# Patient Record
Sex: FEMALE | Race: BLACK OR AFRICAN AMERICAN | Employment: FULL TIME | URBAN - METROPOLITAN AREA
[De-identification: names, ages, dates, MRNs, and addresses within clinical notes are randomized per-mention and may not be internally consistent; named-entity substitution may affect disease eponyms.]

---

## 2017-03-16 ENCOUNTER — OFFICE VISIT (OUTPATIENT)
Dept: FAMILY MEDICINE CLINIC | Age: 47
End: 2017-03-16

## 2017-03-16 VITALS
OXYGEN SATURATION: 98 % | RESPIRATION RATE: 18 BRPM | DIASTOLIC BLOOD PRESSURE: 106 MMHG | SYSTOLIC BLOOD PRESSURE: 155 MMHG | HEART RATE: 94 BPM | WEIGHT: 197 LBS | TEMPERATURE: 98.4 F | HEIGHT: 68 IN | BODY MASS INDEX: 29.86 KG/M2

## 2017-03-16 DIAGNOSIS — I10 ESSENTIAL HYPERTENSION: Primary | ICD-10-CM

## 2017-03-16 RX ORDER — VALSARTAN AND HYDROCHLOROTHIAZIDE 80; 12.5 MG/1; MG/1
1 TABLET, FILM COATED ORAL DAILY
Qty: 30 TAB | Refills: 1 | Status: SHIPPED | OUTPATIENT
Start: 2017-03-16 | End: 2017-04-03 | Stop reason: DRUGHIGH

## 2017-03-16 NOTE — PATIENT INSTRUCTIONS
High Blood Pressure: Care Instructions  Your Care Instructions  If your blood pressure is usually above 140/90, you have high blood pressure, or hypertension. That means the top number is 140 or higher or the bottom number is 90 or higher, or both. Despite what a lot of people think, high blood pressure usually doesn't cause headaches or make you feel dizzy or lightheaded. It usually has no symptoms. But it does increase your risk for heart attack, stroke, and kidney or eye damage. The higher your blood pressure, the more your risk increases. Your doctor will give you a goal for your blood pressure. Your goal will be based on your health and your age. An example of a goal is to keep your blood pressure below 140/90. Lifestyle changes, such as eating healthy and being active, are always important to help lower blood pressure. You might also take medicine to reach your blood pressure goal.  Follow-up care is a key part of your treatment and safety. Be sure to make and go to all appointments, and call your doctor if you are having problems. It's also a good idea to know your test results and keep a list of the medicines you take. How can you care for yourself at home? Medical treatment  · If you stop taking your medicine, your blood pressure will go back up. You may take one or more types of medicine to lower your blood pressure. Be safe with medicines. Take your medicine exactly as prescribed. Call your doctor if you think you are having a problem with your medicine. · Talk to your doctor before you start taking aspirin every day. Aspirin can help certain people lower their risk of a heart attack or stroke. But taking aspirin isn't right for everyone, because it can cause serious bleeding. · See your doctor regularly. You may need to see the doctor more often at first or until your blood pressure comes down.   · If you are taking blood pressure medicine, talk to your doctor before you take decongestants or anti-inflammatory medicine, such as ibuprofen. Some of these medicines can raise blood pressure. · Learn how to check your blood pressure at home. Lifestyle changes  · Stay at a healthy weight. This is especially important if you put on weight around the waist. Losing even 10 pounds can help you lower your blood pressure. · If your doctor recommends it, get more exercise. Walking is a good choice. Bit by bit, increase the amount you walk every day. Try for at least 30 minutes on most days of the week. You also may want to swim, bike, or do other activities. · Avoid or limit alcohol. Talk to your doctor about whether you can drink any alcohol. · Try to limit how much sodium you eat to less than 2,300 milligrams (mg) a day. Your doctor may ask you to try to eat less than 1,500 mg a day. · Eat plenty of fruits (such as bananas and oranges), vegetables, legumes, whole grains, and low-fat dairy products. · Lower the amount of saturated fat in your diet. Saturated fat is found in animal products such as milk, cheese, and meat. Limiting these foods may help you lose weight and also lower your risk for heart disease. · Do not smoke. Smoking increases your risk for heart attack and stroke. If you need help quitting, talk to your doctor about stop-smoking programs and medicines. These can increase your chances of quitting for good. When should you call for help? Call 911 anytime you think you may need emergency care. This may mean having symptoms that suggest that your blood pressure is causing a serious heart or blood vessel problem. Your blood pressure may be over 180/110. For example, call 911 if:  · You have symptoms of a heart attack. These may include:  ¨ Chest pain or pressure, or a strange feeling in the chest.  ¨ Sweating. ¨ Shortness of breath. ¨ Nausea or vomiting. ¨ Pain, pressure, or a strange feeling in the back, neck, jaw, or upper belly or in one or both shoulders or arms.   ¨ Lightheadedness or sudden weakness. ¨ A fast or irregular heartbeat. · You have symptoms of a stroke. These may include:  ¨ Sudden numbness, tingling, weakness, or loss of movement in your face, arm, or leg, especially on only one side of your body. ¨ Sudden vision changes. ¨ Sudden trouble speaking. ¨ Sudden confusion or trouble understanding simple statements. ¨ Sudden problems with walking or balance. ¨ A sudden, severe headache that is different from past headaches. · You have severe back or belly pain. Do not wait until your blood pressure comes down on its own. Get help right away. Call your doctor now or seek immediate care if:  · Your blood pressure is much higher than normal (such as 180/110 or higher), but you don't have symptoms. · You think high blood pressure is causing symptoms, such as:  ¨ Severe headache. ¨ Blurry vision. Watch closely for changes in your health, and be sure to contact your doctor if:  · Your blood pressure measures 140/90 or higher at least 2 times. That means the top number is 140 or higher or the bottom number is 90 or higher, or both. · You think you may be having side effects from your blood pressure medicine. · Your blood pressure is usually normal, but it goes above normal at least 2 times. Where can you learn more? Go to http://alexa-kirby.info/. Enter K449 in the search box to learn more about \"High Blood Pressure: Care Instructions. \"  Current as of: August 8, 2016  Content Version: 11.1  © 6384-5878 [employer]. Care instructions adapted under license by [employer] (which disclaims liability or warranty for this information). If you have questions about a medical condition or this instruction, always ask your healthcare professional. [place] [address] any warranty or liability for your use of this information.        DASH Diet: Care Instructions  Your Care Instructions  The DASH diet is an eating plan that can help lower your blood pressure. DASH stands for Dietary Approaches to Stop Hypertension. Hypertension is high blood pressure. The DASH diet focuses on eating foods that are high in calcium, potassium, and magnesium. These nutrients can lower blood pressure. The foods that are highest in these nutrients are fruits, vegetables, low-fat dairy products, nuts, seeds, and legumes. But taking calcium, potassium, and magnesium supplements instead of eating foods that are high in those nutrients does not have the same effect. The DASH diet also includes whole grains, fish, and poultry. The DASH diet is one of several lifestyle changes your doctor may recommend to lower your high blood pressure. Your doctor may also want you to decrease the amount of sodium in your diet. Lowering sodium while following the DASH diet can lower blood pressure even further than just the DASH diet alone. Follow-up care is a key part of your treatment and safety. Be sure to make and go to all appointments, and call your doctor if you are having problems. It's also a good idea to know your test results and keep a list of the medicines you take. How can you care for yourself at home? Following the DASH diet  · Eat 4 to 5 servings of fruit each day. A serving is 1 medium-sized piece of fruit, ½ cup chopped or canned fruit, 1/4 cup dried fruit, or 4 ounces (½ cup) of fruit juice. Choose fruit more often than fruit juice. · Eat 4 to 5 servings of vegetables each day. A serving is 1 cup of lettuce or raw leafy vegetables, ½ cup of chopped or cooked vegetables, or 4 ounces (½ cup) of vegetable juice. Choose vegetables more often than vegetable juice. · Get 2 to 3 servings of low-fat and fat-free dairy each day. A serving is 8 ounces of milk, 1 cup of yogurt, or 1 ½ ounces of cheese. · Eat 6 to 8 servings of grains each day.  A serving is 1 slice of bread, 1 ounce of dry cereal, or ½ cup of cooked rice, pasta, or cooked cereal. Try to choose whole-grain products as much as possible. · Limit lean meat, poultry, and fish to 2 servings each day. A serving is 3 ounces, about the size of a deck of cards. · Eat 4 to 5 servings of nuts, seeds, and legumes (cooked dried beans, lentils, and split peas) each week. A serving is 1/3 cup of nuts, 2 tablespoons of seeds, or ½ cup of cooked beans or peas. · Limit fats and oils to 2 to 3 servings each day. A serving is 1 teaspoon of vegetable oil or 2 tablespoons of salad dressing. · Limit sweets and added sugars to 5 servings or less a week. A serving is 1 tablespoon jelly or jam, ½ cup sorbet, or 1 cup of lemonade. · Eat less than 2,300 milligrams (mg) of sodium a day. If you limit your sodium to 1,500 mg a day, you can lower your blood pressure even more. Tips for success  · Start small. Do not try to make dramatic changes to your diet all at once. You might feel that you are missing out on your favorite foods and then be more likely to not follow the plan. Make small changes, and stick with them. Once those changes become habit, add a few more changes. · Try some of the following:  ¨ Make it a goal to eat a fruit or vegetable at every meal and at snacks. This will make it easy to get the recommended amount of fruits and vegetables each day. ¨ Try yogurt topped with fruit and nuts for a snack or healthy dessert. ¨ Add lettuce, tomato, cucumber, and onion to sandwiches. ¨ Combine a ready-made pizza crust with low-fat mozzarella cheese and lots of vegetable toppings. Try using tomatoes, squash, spinach, broccoli, carrots, cauliflower, and onions. ¨ Have a variety of cut-up vegetables with a low-fat dip as an appetizer instead of chips and dip. ¨ Sprinkle sunflower seeds or chopped almonds over salads. Or try adding chopped walnuts or almonds to cooked vegetables. ¨ Try some vegetarian meals using beans and peas. Add garbanzo or kidney beans to salads.  Make burritos and tacos with mashed cooper beans or black beans.  Where can you learn more? Go to http://alexa-kirby.info/. Enter P152 in the search box to learn more about \"DASH Diet: Care Instructions. \"  Current as of: March 23, 2016  Content Version: 11.1  © 1129-0829 [employer]. Care instructions adapted under license by [employer] (which disclaims liability or warranty for this information). If you have questions about a medical condition or this instruction, always ask your healthcare professional. [address] any warranty or liability for your use of this information.

## 2017-03-16 NOTE — MR AVS SNAPSHOT
Visit Information Date & Time Provider Department Dept. Phone Encounter #  
 3/16/2017  1:15 PM Franklyn Hernandez NP [address] [facility] [phone] [id number] Follow-up Instructions Return in about 4 days (around 3/20/2017) for recheck BP and fasting labs. Upcoming Health Maintenance Date Due Pneumococcal 19-64 Medium Risk (1 of 1 - PPSV23) 9/10/1989 DTaP/Tdap/Td series (1 - Tdap) 9/10/1991 PAP AKA CERVICAL CYTOLOGY 9/10/1991 INFLUENZA AGE 9 TO ADULT 8/1/2016 Allergies as of 3/16/2017  Review Complete On: 3/16/2017 By: Franklyn Hernandez NP No Known Allergies Current Immunizations  Never Reviewed No immunizations on file. Not reviewed this visit You Were Diagnosed With   
  
 Codes Comments Essential hypertension    -  Primary ICD-10-CM: I10 
ICD-9-CM: 401.9 Vitals BP Pulse Temp Resp Height(growth percentile) Weight(growth percentile) (!) 155/106 (BP 1 Location: Right arm, BP Patient Position: Sitting) 94 98.4 °F (36.9 °C) (Oral) 18 5' 8\" (1.727 m) 197 lb (89.4 kg) LMP SpO2 BMI OB Status Smoking Status 02/20/2017 98% 29.95 kg/m2 Having regular periods Current Every Day Smoker Vitals History BMI and BSA Data Body Mass Index Body Surface Area  
 29.95 kg/m 2 2.07 m 2 Preferred Pharmacy Pharmacy Name Phone Darrell Yang [address], [address] AT [facility] OF  [facility] [phone] Your Updated Medication List  
  
   
This list is accurate as of: 3/16/17  1:37 PM.  Always use your most recent med list.  
  
  
  
  
 amitriptyline 100 mg tablet Commonly known as:  ELAVIL Take 200 mg by mouth nightly. clonazePAM 1 mg tablet Commonly known as:  Lyndol [place] Take 1 mg by mouth two (2) times a day. QUEtiapine 50 mg tablet Commonly known as:  SEROquel Take 50 mg by mouth nightly. valsartan-hydroCHLOROthiazide 80-12.5 mg per tablet Commonly known as:  DIOVAN-HCT Take 1 Tab by mouth daily. XANAX 1 mg tablet Generic drug:  ALPRAZolam  
Take 1 mg by mouth three (3) times daily as needed for Anxiety. Prescriptions Sent to Pharmacy Refills  
 valsartan-hydroCHLOROthiazide (DIOVAN-HCT) 80-12.5 mg per tablet 1 Sig: Take 1 Tab by mouth daily. Class: Normal  
 Pharmacy: [facility] [place] [address] [facility] #: [phone] Route: Oral  
  
Follow-up Instructions Return in about 4 days (around 3/20/2017) for recheck BP and fasting labs. Patient Instructions High Blood Pressure: Care Instructions Your Care Instructions If your blood pressure is usually above 140/90, you have high blood pressure, or hypertension. That means the top number is 140 or higher or the bottom number is 90 or higher, or both. Despite what a lot of people think, high blood pressure usually doesn't cause headaches or make you feel dizzy or lightheaded. It usually has no symptoms. But it does increase your risk for heart attack, stroke, and kidney or eye damage. The higher your blood pressure, the more your risk increases. Your doctor will give you a goal for your blood pressure. Your goal will be based on your health and your age. An example of a goal is to keep your blood pressure below 140/90. Lifestyle changes, such as eating healthy and being active, are always important to help lower blood pressure. You might also take medicine to reach your blood pressure goal. 
Follow-up care is a key part of your treatment and safety. Be sure to make and go to all appointments, and call your doctor if you are having problems. It's also a good idea to know your test results and keep a list of the medicines you take. How can you care for yourself at home? Medical treatment · If you stop taking your medicine, your blood pressure will go back up. You may take one or more types of medicine to lower your blood pressure. Be safe with medicines. Take your medicine exactly as prescribed. Call your doctor if you think you are having a problem with your medicine. · Talk to your doctor before you start taking aspirin every day. Aspirin can help certain people lower their risk of a heart attack or stroke. But taking aspirin isn't right for everyone, because it can cause serious bleeding. · See your doctor regularly. You may need to see the doctor more often at first or until your blood pressure comes down. · If you are taking blood pressure medicine, talk to your doctor before you take decongestants or anti-inflammatory medicine, such as ibuprofen. Some of these medicines can raise blood pressure. · Learn how to check your blood pressure at home. Lifestyle changes · Stay at a healthy weight. This is especially important if you put on weight around the waist. Losing even 10 pounds can help you lower your blood pressure. · If your doctor recommends it, get more exercise. Walking is a good choice. Bit by bit, increase the amount you walk every day. Try for at least 30 minutes on most days of the week. You also may want to swim, bike, or do other activities. · Avoid or limit alcohol. Talk to your doctor about whether you can drink any alcohol. · Try to limit how much sodium you eat to less than 2,300 milligrams (mg) a day. Your doctor may ask you to try to eat less than 1,500 mg a day. · Eat plenty of fruits (such as bananas and oranges), vegetables, legumes, whole grains, and low-fat dairy products. · Lower the amount of saturated fat in your diet. Saturated fat is found in animal products such as milk, cheese, and meat. Limiting these foods may help you lose weight and also lower your risk for heart disease. · Do not smoke. Smoking increases your risk for heart attack and stroke. If you need help quitting, talk to your doctor about stop-smoking programs and medicines. These can increase your chances of quitting for good. When should you call for help? Call 911 anytime you think you may need emergency care. This may mean having symptoms that suggest that your blood pressure is causing a serious heart or blood vessel problem. Your blood pressure may be over 180/110. For example, call 911 if: 
· You have symptoms of a heart attack. These may include: ¨ Chest pain or pressure, or a strange feeling in the chest. 
¨ Sweating. ¨ Shortness of breath. ¨ Nausea or vomiting. ¨ Pain, pressure, or a strange feeling in the back, neck, jaw, or upper belly or in one or both shoulders or arms. ¨ Lightheadedness or sudden weakness. ¨ A fast or irregular heartbeat. · You have symptoms of a stroke. These may include: 
¨ Sudden numbness, tingling, weakness, or loss of movement in your face, arm, or leg, especially on only one side of your body. ¨ Sudden vision changes. ¨ Sudden trouble speaking. ¨ Sudden confusion or trouble understanding simple statements. ¨ Sudden problems with walking or balance. ¨ A sudden, severe headache that is different from past headaches. · You have severe back or belly pain. Do not wait until your blood pressure comes down on its own. Get help right away. Call your doctor now or seek immediate care if: 
· Your blood pressure is much higher than normal (such as 180/110 or higher), but you don't have symptoms. · You think high blood pressure is causing symptoms, such as: ¨ Severe headache. ¨ Blurry vision. Watch closely for changes in your health, and be sure to contact your doctor if: 
· Your blood pressure measures 140/90 or higher at least 2 times. That means the top number is 140 or higher or the bottom number is 90 or higher, or both. · You think you may be having side effects from your blood pressure medicine. · Your blood pressure is usually normal, but it goes above normal at least 2 times. Where can you learn more? Go to http://alexa-kirby.info/. Enter O408 in the search box to learn more about \"High Blood Pressure: Care Instructions. \" Current as of: August 8, 2016 Content Version: 11.1 © 2013-1862 [employer]. Care instructions adapted under license by for; to (do) Centers (which disclaims liability or warranty for this information). If you have questions about a medical condition or this instruction, always ask your healthcare professional. [address] any warranty or liability for your use of this information. DASH Diet: Care Instructions Your Care Instructions The DASH diet is an eating plan that can help lower your blood pressure. DASH stands for Dietary Approaches to Stop Hypertension. Hypertension is high blood pressure. The DASH diet focuses on eating foods that are high in calcium, potassium, and magnesium. These nutrients can lower blood pressure. The foods that are highest in these nutrients are fruits, vegetables, low-fat dairy products, nuts, seeds, and legumes. But taking calcium, potassium, and magnesium supplements instead of eating foods that are high in those nutrients does not have the same effect. The DASH diet also includes whole grains, fish, and poultry. The DASH diet is one of several lifestyle changes your doctor may recommend to lower your high blood pressure. Your doctor may also want you to decrease the amount of sodium in your diet. Lowering sodium while following the DASH diet can lower blood pressure even further than just the DASH diet alone. Follow-up care is a key part of your treatment and safety. Be sure to make and go to all appointments, and call your doctor if you are having problems. It's also a good idea to know your test results and keep a list of the medicines you take. How can you care for yourself at home? Following the DASH diet · Eat 4 to 5 servings of fruit each day. A serving is 1 medium-sized piece of fruit, ½ cup chopped or canned fruit, 1/4 cup dried fruit, or 4 ounces (½ cup) of fruit juice. Choose fruit more often than fruit juice. · Eat 4 to 5 servings of vegetables each day. A serving is 1 cup of lettuce or raw leafy vegetables, ½ cup of chopped or cooked vegetables, or 4 ounces (½ cup) of vegetable juice. Choose vegetables more often than vegetable juice. · Get 2 to 3 servings of low-fat and fat-free dairy each day. A serving is 8 ounces of milk, 1 cup of yogurt, or 1 ½ ounces of cheese. · Eat 6 to 8 servings of grains each day. A serving is 1 slice of bread, 1 ounce of dry cereal, or ½ cup of cooked rice, pasta, or cooked cereal. Try to choose whole-grain products as much as possible. · Limit lean meat, poultry, and fish to 2 servings each day. A serving is 3 ounces, about the size of a deck of cards. · Eat 4 to 5 servings of nuts, seeds, and legumes (cooked dried beans, lentils, and split peas) each week. A serving is 1/3 cup of nuts, 2 tablespoons of seeds, or ½ cup of cooked beans or peas. · Limit fats and oils to 2 to 3 servings each day. A serving is 1 teaspoon of vegetable oil or 2 tablespoons of salad dressing. · Limit sweets and added sugars to 5 servings or less a week. A serving is 1 tablespoon jelly or jam, ½ cup sorbet, or 1 cup of lemonade. · Eat less than 2,300 milligrams (mg) of sodium a day. If you limit your sodium to 1,500 mg a day, you can lower your blood pressure even more. Tips for success · Start small. Do not try to make dramatic changes to your diet all at once. You might feel that you are missing out on your favorite foods and then be more likely to not follow the plan. Make small changes, and stick with them. Once those changes become habit, add a few more changes. · Try some of the following: ¨ Make it a goal to eat a fruit or vegetable at every meal and at snacks. This will make it easy to get the recommended amount of fruits and vegetables each day. ¨ Try yogurt topped with fruit and nuts for a snack or healthy dessert. ¨ Add lettuce, tomato, cucumber, and onion to sandwiches. ¨ Combine a ready-made pizza crust with low-fat mozzarella cheese and lots of vegetable toppings. Try using tomatoes, squash, spinach, broccoli, carrots, cauliflower, and onions. ¨ Have a variety of cut-up vegetables with a low-fat dip as an appetizer instead of chips and dip. ¨ Sprinkle sunflower seeds or chopped almonds over salads. Or try adding chopped walnuts or almonds to cooked vegetables. ¨ Try some vegetarian meals using beans and peas. Add garbanzo or kidney beans to salads. Make burritos and tacos with mashed cooper beans or black beans. Where can you learn more? Go to http://alexaBlackwavekirby.info/. Enter X394 in the search box to learn more about \"DASH Diet: Care Instructions. \" Current as of: March 23, 2016 Content Version: 11.1 © 0360-4076 [employer]. Care instructions adapted under license by [employer] (which disclaims liability or warranty for this information). If you have questions about a medical condition or this instruction, always ask your healthcare professional. [place] [address] any warranty or liability for your use of this information. Introducing \[facility] [place] [facility]\"" & HEALTH SERVICES! [employer] introduces [employer] patient portal. Now you can access parts of your medical record, email your doctor's office, and request medication refills online. 1. In your internet browser, go to [email or web address] 2. Click on the First Time User? Click Here link in the Sign In box. You will see the New Member Sign Up page. 3. Enter your Parabel Access Code exactly as it appears below.  You will not need to use this code after youve completed the sign-up process. If you do not sign up before the expiration date, you must request a new code. · Simple IT Access Code: [email or web address] Expires: 6/14/2017  1:37 PM 
 
4. Enter the last four digits of your Social Security Number (xxxx) and Date of Birth (mm/dd/yyyy) as indicated and click Submit. You will be taken to the next sign-up page. 5. Create a Simple IT ID. This will be your Simple IT login ID and cannot be changed, so think of one that is secure and easy to remember. 6. Create a Simple IT password. You can change your password at any time. 7. Enter your Password Reset Question and Answer. This can be used at a later time if you forget your password. 8. Enter your e-mail address. You will receive e-mail notification when new information is available in [address]. 9. Click Sign Up. You can now view and download portions of your medical record. 10. Click the Download Summary menu link to download a portable copy of your medical information. If you have questions, please visit the Frequently Asked Questions section of the Simple IT website. Remember, Simple IT is NOT to be used for urgent needs. For medical emergencies, dial 911. Now available from your iPhone and Android! Please provide this summary of care documentation to your next provider. Your primary care clinician is listed as Adeline Ventura. If you have any questions after today's visit, please call [phone].

## 2017-03-16 NOTE — PROGRESS NOTES
Chief Complaint   Patient presents with    Elevated Blood Pressure     Patient in office today for elevated bp; bp was 161/113 at clinic for work; states she has had headaches. 1. Have you been to the ER, urgent care clinic since your last visit? Hospitalized since your last visit? No    2. Have you seen or consulted any other health care providers outside of the [address] [place] [address] since your last visit? Include any pap smears or colon screening. No    Pt thought she was coming down with a stomach virus. Felt weak. Went to work nurse who checked her BP and it was elevated. Has been having headaches on and off. Pt has a family history of high BP. Denies any sx of fluid retention. Has been constipated on and off. Attributing to her elavil. Has internal hemorrhoids from straining which has caused blood in her stool. Denies any cp, sob, and dyspnea. Denies any dizziness. HAs occur as the day goes by. One woke her up at 2 am.     Denies any excessive salt. Pt is a smoker. Less than 1 PPD. Trying to follow a healthy diet. Has gained some weight. Pt works for [employer]. Unable to work until her BP has improved. Denies any other concerns at this. Denies any other concerns at this time. Chief Complaint   Patient presents with    Elevated Blood Pressure     she is a 55y.o. year old female who presents for evalution. Reviewed PmHx, RxHx, FmHx, SocHx, AllgHx and updated and dated in the chart.     Review of Systems - negative except as listed above in the HPI    Objective:     Vitals:    03/16/17 1319   BP: (!) 155/106   Pulse: 94   Resp: 18   Temp: 98.4 °F (36.9 °C)   TempSrc: Oral   SpO2: 98%   Weight: 197 lb (89.4 kg)   Height: 5' 8\" (1.727 m)     Physical Examination: General appearance - alert, well appearing, and in no distress  Eyes - pupils equal and reactive, extraocular eye movements intact  Ears - bilateral TM's and external ear canals normal  Nose - normal and patent, no erythema, discharge or polyps and normal nontender sinuses  Mouth - mucous membranes moist, pharynx normal without lesions  Neck - supple, no significant adenopathy, carotids upstroke normal bilaterally, no bruits, thyroid exam: thyroid is normal in size without nodules or tenderness  Chest - clear to auscultation, no wheezes, rales or rhonchi, symmetric air entry  Heart - normal rate, regular rhythm, normal S1, S2, no murmurs  Extremities - peripheral pulses normal, no ankle edema, no clubbing or cyanosis    Assessment/ Plan:   Theodora Rome was seen today for elevated blood pressure. Diagnoses and all orders for this visit:    Essential hypertension  -     valsartan-hydroCHLOROthiazide (DIOVAN-HCT) 80-12.5 mg per tablet; Take 1 Tab by mouth daily. Start new rx. Reviewed SEs/ADRs of medication. Enc to work on diet and reviewed other measures to lower BP. Reviewed target BP. Follow up on Monday to recheck BP. Follow-up Disposition:  Return in about 4 days (around 3/20/2017) for recheck BP and fasting labs. I have discussed the diagnosis with the patient and the intended plan as seen in the above orders. The patient has received an after-visit summary and questions were answered concerning future plans. Medication Side Effects and Warnings were discussed with patient: yes  Patient Labs were reviewed and or requested: no  Patient Past Records were reviewed and or requested  yes  Patient / Caregiver Understanding of treatment plan was verbalized during office visit DANK Pineda    Patient Instructions        High Blood Pressure: Care Instructions  Your Care Instructions  If your blood pressure is usually above 140/90, you have high blood pressure, or hypertension. That means the top number is 140 or higher or the bottom number is 90 or higher, or both. Despite what a lot of people think, high blood pressure usually doesn't cause headaches or make you feel dizzy or lightheaded.  It usually has no symptoms. But it does increase your risk for heart attack, stroke, and kidney or eye damage. The higher your blood pressure, the more your risk increases. Your doctor will give you a goal for your blood pressure. Your goal will be based on your health and your age. An example of a goal is to keep your blood pressure below 140/90. Lifestyle changes, such as eating healthy and being active, are always important to help lower blood pressure. You might also take medicine to reach your blood pressure goal.  Follow-up care is a key part of your treatment and safety. Be sure to make and go to all appointments, and call your doctor if you are having problems. It's also a good idea to know your test results and keep a list of the medicines you take. How can you care for yourself at home? Medical treatment  · If you stop taking your medicine, your blood pressure will go back up. You may take one or more types of medicine to lower your blood pressure. Be safe with medicines. Take your medicine exactly as prescribed. Call your doctor if you think you are having a problem with your medicine. · Talk to your doctor before you start taking aspirin every day. Aspirin can help certain people lower their risk of a heart attack or stroke. But taking aspirin isn't right for everyone, because it can cause serious bleeding. · See your doctor regularly. You may need to see the doctor more often at first or until your blood pressure comes down. · If you are taking blood pressure medicine, talk to your doctor before you take decongestants or anti-inflammatory medicine, such as ibuprofen. Some of these medicines can raise blood pressure. · Learn how to check your blood pressure at home. Lifestyle changes  · Stay at a healthy weight. This is especially important if you put on weight around the waist. Losing even 10 pounds can help you lower your blood pressure. · If your doctor recommends it, get more exercise.  Walking is a good choice. Bit by bit, increase the amount you walk every day. Try for at least 30 minutes on most days of the week. You also may want to swim, bike, or do other activities. · Avoid or limit alcohol. Talk to your doctor about whether you can drink any alcohol. · Try to limit how much sodium you eat to less than 2,300 milligrams (mg) a day. Your doctor may ask you to try to eat less than 1,500 mg a day. · Eat plenty of fruits (such as bananas and oranges), vegetables, legumes, whole grains, and low-fat dairy products. · Lower the amount of saturated fat in your diet. Saturated fat is found in animal products such as milk, cheese, and meat. Limiting these foods may help you lose weight and also lower your risk for heart disease. · Do not smoke. Smoking increases your risk for heart attack and stroke. If you need help quitting, talk to your doctor about stop-smoking programs and medicines. These can increase your chances of quitting for good. When should you call for help? Call 911 anytime you think you may need emergency care. This may mean having symptoms that suggest that your blood pressure is causing a serious heart or blood vessel problem. Your blood pressure may be over 180/110. For example, call 911 if:  · You have symptoms of a heart attack. These may include:  ¨ Chest pain or pressure, or a strange feeling in the chest.  ¨ Sweating. ¨ Shortness of breath. ¨ Nausea or vomiting. ¨ Pain, pressure, or a strange feeling in the back, neck, jaw, or upper belly or in one or both shoulders or arms. ¨ Lightheadedness or sudden weakness. ¨ A fast or irregular heartbeat. · You have symptoms of a stroke. These may include:  ¨ Sudden numbness, tingling, weakness, or loss of movement in your face, arm, or leg, especially on only one side of your body. ¨ Sudden vision changes. ¨ Sudden trouble speaking. ¨ Sudden confusion or trouble understanding simple statements.   ¨ Sudden problems with walking or balance. ¨ A sudden, severe headache that is different from past headaches. · You have severe back or belly pain. Do not wait until your blood pressure comes down on its own. Get help right away. Call your doctor now or seek immediate care if:  · Your blood pressure is much higher than normal (such as 180/110 or higher), but you don't have symptoms. · You think high blood pressure is causing symptoms, such as:  ¨ Severe headache. ¨ Blurry vision. Watch closely for changes in your health, and be sure to contact your doctor if:  · Your blood pressure measures 140/90 or higher at least 2 times. That means the top number is 140 or higher or the bottom number is 90 or higher, or both. · You think you may be having side effects from your blood pressure medicine. · Your blood pressure is usually normal, but it goes above normal at least 2 times. Where can you learn more? Go to http://alexa-kirby.info/. Enter V729 in the search box to learn more about \"High Blood Pressure: Care Instructions. \"  Current as of: August 8, 2016  Content Version: 11.1  © 5293-0033 [employer]. Care instructions adapted under license by [employer] (which disclaims liability or warranty for this information). If you have questions about a medical condition or this instruction, always ask your healthcare professional. [address] any warranty or liability for your use of this information. DASH Diet: Care Instructions  Your Care Instructions  The DASH diet is an eating plan that can help lower your blood pressure. DASH stands for Dietary Approaches to Stop Hypertension. Hypertension is high blood pressure. The DASH diet focuses on eating foods that are high in calcium, potassium, and magnesium. These nutrients can lower blood pressure. The foods that are highest in these nutrients are fruits, vegetables, low-fat dairy products, nuts, seeds, and legumes.  But taking calcium, potassium, and magnesium supplements instead of eating foods that are high in those nutrients does not have the same effect. The DASH diet also includes whole grains, fish, and poultry. The DASH diet is one of several lifestyle changes your doctor may recommend to lower your high blood pressure. Your doctor may also want you to decrease the amount of sodium in your diet. Lowering sodium while following the DASH diet can lower blood pressure even further than just the DASH diet alone. Follow-up care is a key part of your treatment and safety. Be sure to make and go to all appointments, and call your doctor if you are having problems. It's also a good idea to know your test results and keep a list of the medicines you take. How can you care for yourself at home? Following the DASH diet  · Eat 4 to 5 servings of fruit each day. A serving is 1 medium-sized piece of fruit, ½ cup chopped or canned fruit, 1/4 cup dried fruit, or 4 ounces (½ cup) of fruit juice. Choose fruit more often than fruit juice. · Eat 4 to 5 servings of vegetables each day. A serving is 1 cup of lettuce or raw leafy vegetables, ½ cup of chopped or cooked vegetables, or 4 ounces (½ cup) of vegetable juice. Choose vegetables more often than vegetable juice. · Get 2 to 3 servings of low-fat and fat-free dairy each day. A serving is 8 ounces of milk, 1 cup of yogurt, or 1 ½ ounces of cheese. · Eat 6 to 8 servings of grains each day. A serving is 1 slice of bread, 1 ounce of dry cereal, or ½ cup of cooked rice, pasta, or cooked cereal. Try to choose whole-grain products as much as possible. · Limit lean meat, poultry, and fish to 2 servings each day. A serving is 3 ounces, about the size of a deck of cards. · Eat 4 to 5 servings of nuts, seeds, and legumes (cooked dried beans, lentils, and split peas) each week. A serving is 1/3 cup of nuts, 2 tablespoons of seeds, or ½ cup of cooked beans or peas.   · Limit fats and oils to 2 to 3 servings each day. A serving is 1 teaspoon of vegetable oil or 2 tablespoons of salad dressing. · Limit sweets and added sugars to 5 servings or less a week. A serving is 1 tablespoon jelly or jam, ½ cup sorbet, or 1 cup of lemonade. · Eat less than 2,300 milligrams (mg) of sodium a day. If you limit your sodium to 1,500 mg a day, you can lower your blood pressure even more. Tips for success  · Start small. Do not try to make dramatic changes to your diet all at once. You might feel that you are missing out on your favorite foods and then be more likely to not follow the plan. Make small changes, and stick with them. Once those changes become habit, add a few more changes. · Try some of the following:  ¨ Make it a goal to eat a fruit or vegetable at every meal and at snacks. This will make it easy to get the recommended amount of fruits and vegetables each day. ¨ Try yogurt topped with fruit and nuts for a snack or healthy dessert. ¨ Add lettuce, tomato, cucumber, and onion to sandwiches. ¨ Combine a ready-made pizza crust with low-fat mozzarella cheese and lots of vegetable toppings. Try using tomatoes, squash, spinach, broccoli, carrots, cauliflower, and onions. ¨ Have a variety of cut-up vegetables with a low-fat dip as an appetizer instead of chips and dip. ¨ Sprinkle sunflower seeds or chopped almonds over salads. Or try adding chopped walnuts or almonds to cooked vegetables. ¨ Try some vegetarian meals using beans and peas. Add garbanzo or kidney beans to salads. Make burritos and tacos with mashed cooper beans or black beans. Where can you learn more? Go to http://alexa-kirby.info/. Enter W869 in the search box to learn more about \"DASH Diet: Care Instructions. \"  Current as of: March 23, 2016  Content Version: 11.1  © 2120-7523 [employer].  Care instructions adapted under license by [employer] (which disclaims liability or warranty for this information). If you have questions about a medical condition or this instruction, always ask your healthcare professional. [place] [address] any warranty or liability for your use of this information.

## 2017-03-16 NOTE — PROGRESS NOTES
Chief Complaint   Patient presents with    Elevated Blood Pressure     Patient in office today for elevated bp; bp was 161/113 at clinic for work; states she has had headaches. 1. Have you been to the ER, urgent care clinic since your last visit? Hospitalized since your last visit? No    2. Have you seen or consulted any other health care providers outside of the [address] [place] [address] since your last visit? Include any pap smears or colon screening.  No

## 2017-03-20 ENCOUNTER — OFFICE VISIT (OUTPATIENT)
Dept: FAMILY MEDICINE CLINIC | Age: 47
End: 2017-03-20

## 2017-03-20 VITALS
DIASTOLIC BLOOD PRESSURE: 94 MMHG | BODY MASS INDEX: 29.86 KG/M2 | SYSTOLIC BLOOD PRESSURE: 144 MMHG | HEART RATE: 92 BPM | HEIGHT: 68 IN | OXYGEN SATURATION: 98 % | TEMPERATURE: 98.2 F | RESPIRATION RATE: 18 BRPM | WEIGHT: 197 LBS

## 2017-03-20 DIAGNOSIS — K21.00 GASTROESOPHAGEAL REFLUX DISEASE WITH ESOPHAGITIS: ICD-10-CM

## 2017-03-20 DIAGNOSIS — R01.1 HEART MURMUR, SYSTOLIC: ICD-10-CM

## 2017-03-20 DIAGNOSIS — Z86.19 HISTORY OF HELICOBACTER PYLORI INFECTION: ICD-10-CM

## 2017-03-20 DIAGNOSIS — Z13.29 SCREENING FOR THYROID DISORDER: ICD-10-CM

## 2017-03-20 DIAGNOSIS — I10 ESSENTIAL HYPERTENSION: Primary | ICD-10-CM

## 2017-03-20 DIAGNOSIS — Z12.39 SCREENING FOR MALIGNANT NEOPLASM OF BREAST: ICD-10-CM

## 2017-03-20 RX ORDER — QUETIAPINE FUMARATE 25 MG/1
TABLET, FILM COATED ORAL
Refills: 5 | COMMUNITY
Start: 2017-03-01 | End: 2017-03-20

## 2017-03-20 RX ORDER — PANTOPRAZOLE SODIUM 20 MG/1
20 TABLET, DELAYED RELEASE ORAL DAILY
Qty: 30 TAB | Refills: 5 | Status: SHIPPED | OUTPATIENT
Start: 2017-03-20 | End: 2018-02-14 | Stop reason: ALTCHOICE

## 2017-03-20 NOTE — PROGRESS NOTES
Chief Complaint   Patient presents with    Hypertension    Labs     Patient in office today for bp check and labs would like to have h.pylori test done due to past hx of dx. 1. Have you been to the ER, urgent care clinic since your last visit? Hospitalized since your last visit? No    2. Have you seen or consulted any other health care providers outside of the [address] [place] [address] since your last visit? Include any pap smears or colon screening. No    Has not taken her BP medication yet this morning. Usually taking her BP medication around noon. Checked it at [facility] on Friday and it remained elevated at 169 over 113. Slight HA this morning. Has to report to the nurse prior to going to work. Discussed that if it does not normalize by Thursday will increase dose of medication. Fasting for lab work. Pt has a history of h pylori. Was diagnosed about 2 years ago. Completed medication regimen. Pt never repeated the breath test.   Pt has been having sx of reflux and heartburn. Has been off of the nexium she was previously taking for reflux that GI recommended. Waking up with a chalky taste in throat and mouth. Sig family history of HTN. Mother had lung cancer and high BP. Had problems with arrhythmias. Family history of heart disease and MI on fathers side. PGM, PAunt. Denies any other concerns at this time. Chief Complaint   Patient presents with    Hypertension    Labs     she is a 55y.o. year old female who presents for evalution. Reviewed PmHx, RxHx, FmHx, SocHx, AllgHx and updated and dated in the chart.     Review of Systems - negative except as listed above in the HPI    Objective:     Vitals:    03/20/17 1010   BP: (!) 144/94   Pulse: 92   Resp: 18   Temp: 98.2 °F (36.8 °C)   TempSrc: Oral   SpO2: 98%   Weight: 197 lb (89.4 kg)   Height: 5' 8\" (1.727 m)     Physical Examination: General appearance - alert, well appearing, and in no distress  Neck - supple, no significant adenopathy, carotids upstroke normal bilaterally, no bruits  Chest - clear to auscultation, no wheezes, rales or rhonchi, symmetric air entry  Heart - normal rate and regular rhythm, midsystolic murmur 6-4/8 at 2nd left intercostal space, at 2nd right intercostal space and does not radiate  Extremities - peripheral pulses normal, no ankle edema, no clubbing or cyanosis  Skin - normal coloration and turgor, no rashes, no suspicious skin lesions noted    Assessment/ Plan:   Isis Brumfield was seen today for hypertension and labs. Diagnoses and all orders for this visit:    Essential hypertension  -     LIPID PANEL  -     METABOLIC PANEL, COMPREHENSIVE  -     CBC WITH AUTOMATED DIFF  Continue diovan hctz at current dose. Pt will need BP checked by nurse at work and will not be able to resume working if it remains elevated. Goal BP <140 over <90. If readings remain elevated will increase dose of medication. Continue to monitor closely. Heart murmur, systolic  -     REFERRAL TO CARDIOLOGY  Enc pt to est care with cardiology for further evaluation. Gastroesophageal reflux disease with esophagitis  -     H. PYLORI BREATH TEST  -     pantoprazole (PROTONIX) 20 mg tablet; Take 1 Tab by mouth daily. Start protonix daily. Reviewed SEs/ADRs of medication. Follow up with GI specialist if sx persist or worsen. History of Helicobacter pylori infection  -     H. PYLORI BREATH TEST  Will notify results and deviate plan based on findings. Screening for thyroid disorder  -     TSH 3RD GENERATION  Screening, asx. Screening for malignant neoplasm of breast  -     DOMI MAMMO BI SCREENING INCL CAD; Future  Enc pt to schedule her annual mammogram.      Follow-up Disposition:  Return in about 6 months (around 9/20/2017) for well woman with pap smear. I have discussed the diagnosis with the patient and the intended plan as seen in the above orders.   The patient has received an after-visit summary and questions were answered concerning future plans. Medication Side Effects and Warnings were discussed with patient: yes  Patient Labs were reviewed and or requested: yes  Patient Past Records were reviewed and or requested  yes  Patient / Caregiver Understanding of treatment plan was verbalized during office visit YES    DANK Root    There are no Patient Instructions on file for this visit.

## 2017-03-20 NOTE — MR AVS SNAPSHOT
Visit Information Date & Time Provider Department Dept. Phone Encounter #  
 3/20/2017  9:45 AM Franklyn Hernandez NP [address] [facility] [phone] [id number] Follow-up Instructions Return in about 6 months (around 9/20/2017) for well woman with pap smear. Upcoming Health Maintenance Date Due Pneumococcal 19-64 Medium Risk (1 of 1 - PPSV23) 9/10/1989 DTaP/Tdap/Td series (1 - Tdap) 9/10/1991 PAP AKA CERVICAL CYTOLOGY 9/10/1991 Allergies as of 3/20/2017  Review Complete On: 3/20/2017 By: Franklyn Hernandez NP No Known Allergies Current Immunizations  Never Reviewed No immunizations on file. Not reviewed this visit You Were Diagnosed With   
  
 Codes Comments Essential hypertension    -  Primary ICD-10-CM: I10 
ICD-9-CM: 401.9 Heart murmur, systolic     DYU-48-SADAF: R50.5 ICD-9-CM: 205. 2 Gastroesophageal reflux disease with esophagitis     ICD-10-CM: K21.0 ICD-9-CM: 530.11 History of Helicobacter pylori infection     ICD-10-CM: Z86.19 ICD-9-CM: V12.09 Screening for thyroid disorder     ICD-10-CM: Z13.29 ICD-9-CM: V77.0 Screening for malignant neoplasm of breast     ICD-10-CM: Z12.39 
ICD-9-CM: V76.10 Vitals BP Pulse Temp Resp Height(growth percentile) Weight(growth percentile) (!) 144/94 (BP 1 Location: Right arm, BP Patient Position: Sitting) 92 98.2 °F (36.8 °C) (Oral) 18 5' 8\" (1.727 m) 197 lb (89.4 kg) LMP SpO2 BMI OB Status Smoking Status 02/20/2017 98% 29.95 kg/m2 Having regular periods Current Every Day Smoker BMI and BSA Data Body Mass Index Body Surface Area  
 29.95 kg/m 2 2.07 m 2 Preferred Pharmacy Pharmacy Name Phone Darrell Yang 043, [address] [facility]  [facility] [phone] Your Updated Medication List  
  
   
This list is accurate as of: 3/20/17 10:31 AM.  Always use your most recent med list.  
  
 amitriptyline 100 mg tablet Commonly known as:  ELAVIL Take 200 mg by mouth nightly. clonazePAM 1 mg tablet Commonly known as:  Naomie Florencia Take 1 mg by mouth two (2) times a day. pantoprazole 20 mg tablet Commonly known as:  PROTONIX Take 1 Tab by mouth daily. QUEtiapine 50 mg tablet Commonly known as:  SEROquel Take 50 mg by mouth nightly. valsartan-hydroCHLOROthiazide 80-12.5 mg per tablet Commonly known as:  DIOVAN-HCT Take 1 Tab by mouth daily. XANAX 1 mg tablet Generic drug:  ALPRAZolam  
Take 1 mg by mouth three (3) times daily as needed for Anxiety. Prescriptions Sent to Pharmacy Refills  
 pantoprazole (PROTONIX) 20 mg tablet 5 Sig: Take 1 Tab by mouth daily. Class: Normal  
 Pharmacy: [facility] [place], [address] [facility] #: [phone] Route: Oral  
  
We Performed the Following CBC WITH AUTOMATED DIFF [50807 CPT(R)] Elfredia Hey TEST [15398 CPT(R)] LIPID PANEL [19036 CPT(R)] METABOLIC PANEL, COMPREHENSIVE [29408 CPT(R)] REFERRAL TO CARDIOLOGY [AEY74 Custom] TSH 3RD GENERATION [81307 CPT(R)] Follow-up Instructions Return in about 6 months (around 9/20/2017) for well woman with pap smear. To-Do List   
 04/12/2017 Imaging:  DOMI MAMMO BI SCREENING INCL CAD Referral Information Referral ID Referred By Referred To  
  
 [id number] Daniel Quiros MD   
   [address] [place] [address] Phone: [phone] Fax: [phone] Visits Status Start Date End Date 1 New Request 3/20/17 3/20/18 If your referral has a status of pending review or denied, additional information will be sent to support the outcome of this decision. Introducing [facility]!    
 [employer] introduces [employer]its patient portal. Now you can access parts of your medical record, email your doctor's office, and request medication refills online. 1. In your internet browser, go to [email or web address] 2. Click on the First Time User? Click Here link in the Sign In box. You will see the New Member Sign Up page. 3. Enter your [facility] Access Code exactly as it appears below. You will not need to use this code after youve completed the sign-up process. If you do not sign up before the expiration date, you must request a new code. · [facility] Access Code: [email or web address] Expires: 6/14/2017  1:37 PM 
 
4. Enter the last four digits of your Social Security Number (xxxx) and Date of Birth (mm/dd/yyyy) as indicated and click Submit. You will be taken to the next sign-up page. 5. Create a [facility] ID. This will be your [facility] login ID and cannot be changed, so think of one that is secure and easy to remember. 6. Create a [facility] password. You can change your password at any time. 7. Enter your Password Reset Question and Answer. This can be used at a later time if you forget your password. 8. Enter your e-mail address. You will receive e-mail notification when new information is available in [address] 9. Click Sign Up. You can now view and download portions of your medical record. 10. Click the Download Summary menu link to download a portable copy of your medical information. If you have questions, please visit the Frequently Asked Questions section of the [facility] website. Remember, [facility] is NOT to be used for urgent needs. For medical emergencies, dial 911. Now available from your iPhone and Android! Please provide this summary of care documentation to your next provider. Your primary care clinician is listed as Timothy Patel. If you have any questions after today's visit, please call [phone].

## 2017-03-20 NOTE — PROGRESS NOTES
Chief Complaint   Patient presents with    Hypertension    Labs     Patient in office today for bp check; and labs would like to have h.pylori test done due to past hx of dx. 1. Have you been to the ER, urgent care clinic since your last visit? Hospitalized since your last visit? No    2. Have you seen or consulted any other health care providers outside of the [address] [place] [address] since your last visit? Include any pap smears or colon screening.  No

## 2017-03-21 LAB
ALBUMIN SERPL-MCNC: 4 G/DL (ref 3.5–5.5)
ALBUMIN/GLOB SERPL: 1.6 {RATIO} (ref 1.2–2.2)
ALP SERPL-CCNC: 47 IU/L (ref 39–117)
ALT SERPL-CCNC: 14 IU/L (ref 0–32)
AST SERPL-CCNC: 14 IU/L (ref 0–40)
BASOPHILS # BLD AUTO: 0 X10E3/UL (ref 0–0.2)
BASOPHILS NFR BLD AUTO: 1 %
BILIRUB SERPL-MCNC: 0.2 MG/DL (ref 0–1.2)
BUN SERPL-MCNC: 14 MG/DL (ref 6–24)
BUN/CREAT SERPL: 14 (ref 9–23)
CALCIUM SERPL-MCNC: 9 MG/DL (ref 8.7–10.2)
CHLORIDE SERPL-SCNC: 99 MMOL/L (ref 96–106)
CHOLEST SERPL-MCNC: 219 MG/DL (ref 100–199)
CO2 SERPL-SCNC: 25 MMOL/L (ref 18–29)
CREAT SERPL-MCNC: 0.99 MG/DL (ref 0.57–1)
EOSINOPHIL # BLD AUTO: 0.3 X10E3/UL (ref 0–0.4)
EOSINOPHIL NFR BLD AUTO: 5 %
ERYTHROCYTE [DISTWIDTH] IN BLOOD BY AUTOMATED COUNT: 14.7 % (ref 12.3–15.4)
GLOBULIN SER CALC-MCNC: 2.5 G/DL (ref 1.5–4.5)
GLUCOSE SERPL-MCNC: 101 MG/DL (ref 65–99)
HCT VFR BLD AUTO: 39.3 % (ref 34–46.6)
HDLC SERPL-MCNC: 64 MG/DL
HGB BLD-MCNC: 13.2 G/DL (ref 11.1–15.9)
IMM GRANULOCYTES # BLD: 0 X10E3/UL (ref 0–0.1)
IMM GRANULOCYTES NFR BLD: 0 %
INTERPRETATION, 910389: NORMAL
LDLC SERPL CALC-MCNC: 132 MG/DL (ref 0–99)
LYMPHOCYTES # BLD AUTO: 2.3 X10E3/UL (ref 0.7–3.1)
LYMPHOCYTES NFR BLD AUTO: 39 %
MCH RBC QN AUTO: 29.9 PG (ref 26.6–33)
MCHC RBC AUTO-ENTMCNC: 33.6 G/DL (ref 31.5–35.7)
MCV RBC AUTO: 89 FL (ref 79–97)
MONOCYTES # BLD AUTO: 0.3 X10E3/UL (ref 0.1–0.9)
MONOCYTES NFR BLD AUTO: 5 %
NEUTROPHILS # BLD AUTO: 2.9 X10E3/UL (ref 1.4–7)
NEUTROPHILS NFR BLD AUTO: 50 %
PLATELET # BLD AUTO: 300 X10E3/UL (ref 150–379)
POTASSIUM SERPL-SCNC: 3.5 MMOL/L (ref 3.5–5.2)
PROT SERPL-MCNC: 6.5 G/DL (ref 6–8.5)
RBC # BLD AUTO: 4.41 X10E6/UL (ref 3.77–5.28)
SODIUM SERPL-SCNC: 140 MMOL/L (ref 134–144)
TRIGL SERPL-MCNC: 115 MG/DL (ref 0–149)
TSH SERPL DL<=0.005 MIU/L-ACNC: 1.39 UIU/ML (ref 0.45–4.5)
VLDLC SERPL CALC-MCNC: 23 MG/DL (ref 5–40)
WBC # BLD AUTO: 5.8 X10E3/UL (ref 3.4–10.8)

## 2017-03-23 DIAGNOSIS — E78.2 MIXED HYPERLIPIDEMIA: Primary | ICD-10-CM

## 2017-03-23 PROBLEM — R73.03 PREDIABETES: Status: ACTIVE | Noted: 2017-03-23

## 2017-03-23 LAB
HBA1C MFR BLD: 5.7 % (ref 4.8–5.6)
SPECIMEN STATUS REPORT, ROLRST: NORMAL
UREA BREATH TEST QL: NEGATIVE

## 2017-03-23 RX ORDER — SIMVASTATIN 10 MG/1
10 TABLET, FILM COATED ORAL
Qty: 30 TAB | Refills: 2 | Status: SHIPPED | OUTPATIENT
Start: 2017-03-23 | End: 2017-04-06

## 2017-03-23 NOTE — PROGRESS NOTES
Please notify pt the followin. h pylori test is negative. 2. Cholesterol is elevated. Has she previously been on medication for high cholesterol? I recommend she consider this and work on making some diet and lifestyle changes. If interested will send zocor to pharmacy on file. I recommend we recheck this in 6 weeks. 3. Fasting glucose was slightly elevated so a1c was added on. a1c shows that she has early prediabetes. I recommend she work on making some diet and lifestyle changes to prevent this from wrosening or will need to start meds. Enc to follow up every 6 months for routine labs to monitor. 4. Normal thyroid function. All other labs are normal. Sending letter with results and recommendations. Let me know about cholesterol med. Repeat fasting labs in 6 weeks.

## 2017-04-03 ENCOUNTER — TELEPHONE (OUTPATIENT)
Dept: FAMILY MEDICINE CLINIC | Age: 47
End: 2017-04-03

## 2017-04-03 DIAGNOSIS — I10 ESSENTIAL HYPERTENSION: ICD-10-CM

## 2017-04-03 RX ORDER — VALSARTAN AND HYDROCHLOROTHIAZIDE 320; 25 MG/1; MG/1
1 TABLET, FILM COATED ORAL DAILY
Qty: 30 TAB | Refills: 2 | Status: SHIPPED | OUTPATIENT
Start: 2017-04-03 | End: 2017-10-02 | Stop reason: SDUPTHER

## 2017-04-03 NOTE — TELEPHONE ENCOUNTER
Pt calling states she needs a refill called in on her blood pressure med Valsartan because due to Sandraalyson Baig telling her to now take 2 pills per day she has used the refills that she had and the pharmacy states they need a new rx sent with the new information on it. She still uses the [facility] on file.

## 2017-04-04 LAB — CREATININE, EXTERNAL: 1.13

## 2017-04-06 ENCOUNTER — OFFICE VISIT (OUTPATIENT)
Dept: FAMILY MEDICINE CLINIC | Age: 47
End: 2017-04-06

## 2017-04-06 VITALS
OXYGEN SATURATION: 98 % | TEMPERATURE: 98.4 F | WEIGHT: 195 LBS | HEIGHT: 68 IN | BODY MASS INDEX: 29.55 KG/M2 | DIASTOLIC BLOOD PRESSURE: 76 MMHG | SYSTOLIC BLOOD PRESSURE: 124 MMHG | HEART RATE: 98 BPM | RESPIRATION RATE: 18 BRPM

## 2017-04-06 DIAGNOSIS — N64.4 BREAST PAIN, LEFT: ICD-10-CM

## 2017-04-06 DIAGNOSIS — I10 ESSENTIAL HYPERTENSION: ICD-10-CM

## 2017-04-06 DIAGNOSIS — R29.898 WEAKNESS OF BOTH LEGS: Primary | ICD-10-CM

## 2017-04-06 DIAGNOSIS — E87.6 HYPOKALEMIA: ICD-10-CM

## 2017-04-06 DIAGNOSIS — R25.2 CRAMP OF BOTH LOWER EXTREMITIES: ICD-10-CM

## 2017-04-06 DIAGNOSIS — E78.2 MIXED HYPERLIPIDEMIA: ICD-10-CM

## 2017-04-06 RX ORDER — POTASSIUM CHLORIDE 20 MEQ/1
20 TABLET, EXTENDED RELEASE ORAL 2 TIMES DAILY
Qty: 60 TAB | Refills: 2 | Status: SHIPPED | OUTPATIENT
Start: 2017-04-06 | End: 2017-11-13 | Stop reason: SDUPTHER

## 2017-04-06 NOTE — PATIENT INSTRUCTIONS

## 2017-04-06 NOTE — PROGRESS NOTES
Chief Complaint   Patient presents with    Breast pain     left breast    Leg Problem     Patient in office today for leg pain; stated legs get really tired after short walking distance; located in only thighs; have been treating with Arthritis BC with no relief. Breast pain began Tuesday; states was seen at [facility]; breast exam was done came back normal; no discharge has been noted; states pain is sporadic with a sharp sensation. States with job there is a lot of lifting and walking. 1. Have you been to the ER, urgent care clinic since your last visit? Hospitalized since your last visit? Yes see note    2. Have you seen or consulted any other health care providers outside of the [employer] [address] [facility] since your last visit? Include any pap smears or colon screening. No    Pt has noticed increased fatigue in her legs that has been worsening. Notices that with walking from car to work she will develop bilateral leg pain and legs feeling tired. Doesn't take much for these sx to occur. Walking 20 feet develops leg pain. States that it was a problem prior to starting her cholesterol medication but worsened after starting the zocor for her cholesterol a few weeks ago. Had sharp pains in the left breast.   Associated sharp pains in the left thigh. Received lab work sven for cardiac enzymes, d dimer, and ekg. Per pt testing was normal.   Received a muscle relaxer for her sx while there. Did not improve the leg pain she was experiencing. Pt has not had a mammogram yet. Denies any other concerns at this time. Chief Complaint   Patient presents with    Breast pain     left breast    Leg Problem     she is a 55y.o. year old female who presents for evalution. Reviewed PmHx, RxHx, FmHx, SocHx, AllgHx and updated and dated in the chart.     Review of Systems - negative except as listed above in the HPI    Objective:     Vitals:    04/06/17 1006   BP: 124/76   Pulse: 98   Resp: 18   Temp: 98.4 °F (36.9 °C)   TempSrc: Oral   SpO2: 98%   Weight: 195 lb (88.5 kg)   Height: 5' 8\" (1.727 m)     Physical Examination: General appearance - alert, well appearing, and in no distress  Eyes - pupils equal and reactive, extraocular eye movements intact  Ears - bilateral TM's and external ear canals normal  Nose - normal and patent, no erythema, discharge or polyps and normal nontender sinuses  Mouth - mucous membranes moist, pharynx normal without lesions  Neck - supple, no significant adenopathy  Chest - clear to auscultation, no wheezes, rales or rhonchi, symmetric air entry  Heart - normal rate, regular rhythm, normal S1, S2, no murmurs  Musculoskeletal - reports pain with manipulation and deep palpation of bilateral thighs with no visible or palpable abnormality  Extremities - peripheral pulses normal, no ankle edema, no clubbing or cyanosis  Skin - normal coloration and turgor, no rashes, no suspicious skin lesions noted    Assessment/ Plan:   Lillie Brenner was seen today for breast pain and leg problem. Diagnoses and all orders for this visit:    Weakness of both legs  Could be a result of low potassium or SE of her zocor. Adjusting meds. Enc pt to follow up if sx persist or worsen after adjusting things. Cramp of both lower extremities / Hypokalemia  -     potassium chloride (K-DUR, KLOR-CON) 20 mEq tablet; Take 1 Tab by mouth two (2) times a day. Discussed that sx are likely due to low K. Start potassium supplement BID. Repeat labs in 6 weeks. Mixed hyperlipidemia  Stop zocor. Continue with efforts to follow a heart healthy diet to improve cholesterol. Provided with resources for this. Follow up in 6 weeks to repeat fasting labs. Breast pain, left  Provided pt with number for central scheduling to schedule her mammogram.   Essential hypertension  BP looks great. Continue medication regimen as prescribed. Follow-up Disposition:  Return in about 6 weeks (around 5/18/2017) for repeat fasting labs.     I have discussed the diagnosis with the patient and the intended plan as seen in the above orders. The patient has received an after-visit summary and questions were answered concerning future plans. Medication Side Effects and Warnings were discussed with patient: yes  Patient Labs were reviewed and or requested: yes  Patient Past Records were reviewed and or requested  yes  Patient / Caregiver Understanding of treatment plan was verbalized during office visit DANK Canas    Patient Instructions        Heart-Healthy Diet: Care Instructions  Your Care Instructions    A heart-healthy diet has lots of vegetables, fruits, nuts, beans, and whole grains, and is low in salt. It limits foods that are high in saturated fat, such as meats, cheeses, and fried foods. It may be hard to change your diet, but even small changes can lower your risk of heart attack and heart disease. Follow-up care is a key part of your treatment and safety. Be sure to make and go to all appointments, and call your doctor if you are having problems. It's also a good idea to know your test results and keep a list of the medicines you take. How can you care for yourself at home? Watch your portions  · Learn what a serving is. A \"serving\" and a \"portion\" are not always the same thing. Make sure that you are not eating larger portions than are recommended. For example, a serving of pasta is ½ cup. A serving size of meat is 2 to 3 ounces. A 3-ounce serving is about the size of a deck of cards. Measure serving sizes until you are good at Jansen" them. Keep in mind that restaurants often serve portions that are 2 or 3 times the size of one serving. · To keep your energy level up and keep you from feeling hungry, eat often but in smaller portions. · Eat only the number of calories you need to stay at a healthy weight.  If you need to lose weight, eat fewer calories than your body burns (through exercise and other physical activity). Eat more fruits and vegetables  · Eat a variety of fruit and vegetables every day. Dark green, deep orange, red, or yellow fruits and vegetables are especially good for you. Examples include spinach, carrots, peaches, and berries. · Keep carrots, celery, and other veggies handy for snacks. Buy fruit that is in season and store it where you can see it so that you will be tempted to eat it. · Cook dishes that have a lot of veggies in them, such as stir-fries and soups. Limit saturated and trans fat  · Read food labels, and try to avoid saturated and trans fats. They increase your risk of heart disease. Trans fat is found in many processed foods such as cookies and crackers. · Use olive or canola oil when you cook. Try cholesterol-lowering spreads, such as Benecol or Take Control. · Bake, broil, grill, or steam foods instead of frying them. · Choose lean meats instead of high-fat meats such as hot dogs and sausages. Cut off all visible fat when you prepare meat. · Eat fish, skinless poultry, and meat alternatives such as soy products instead of high-fat meats. Soy products, such as tofu, may be especially good for your heart. · Choose low-fat or fat-free milk and dairy products. Eat fish  · Eat at least two servings of fish a week. Certain fish, such as salmon and tuna, contain omega-3 fatty acids, which may help reduce your risk of heart attack. Eat foods high in fiber  · Eat a variety of grain products every day. Include whole-grain foods that have lots of fiber and nutrients. Examples of whole-grain foods include oats, whole wheat bread, and brown rice. · Buy whole-grain breads and cereals, instead of white bread or pastries. Limit salt and sodium  · Limit how much salt and sodium you eat to help lower your blood pressure. · Taste food before you salt it. Add only a little salt when you think you need it. With time, your taste buds will adjust to less salt.   · Eat fewer snack items, fast foods, and other high-salt, processed foods. Check food labels for the amount of sodium in packaged foods. · Choose low-sodium versions of canned goods (such as soups, vegetables, and beans). Limit sugar  · Limit drinks and foods with added sugar. These include candy, desserts, and soda pop. Limit alcohol  · Limit alcohol to no more than 2 drinks a day for men and 1 drink a day for women. Too much alcohol can cause health problems. When should you call for help? Watch closely for changes in your health, and be sure to contact your doctor if:  · You would like help planning heart-healthy meals. Where can you learn more? Go to http://alexa-kirby.info/. Enter V137 in the search box to learn more about \"Heart-Healthy Diet: Care Instructions. \"  Current as of: January 27, 2016  Content Version: 11.2  © 1609-2780 [employer]. Care instructions adapted under license by [employer] (which disclaims liability or warranty for this information). If you have questions about a medical condition or this instruction, always ask your healthcare professional. [place] [address] any warranty or liability for your use of this information.

## 2017-04-06 NOTE — PROGRESS NOTES
Chief Complaint   Patient presents with    Breast pain     left breast    Leg Problem     Patient in office today for leg pain;stated legs get really tired after short walking distance; located in only thighs;have been treating with Arthritis BC; with no relief. breast pain began Tuesday; states was seen at [facility]; breast exam was done came back normal; no discharge has been noted;states pain is sporadic with a sharp sensation. States with job there is a lot of lifting and walking. 1. Have you been to the ER, urgent care clinic since your last visit? Hospitalized since your last visit? Yes see note    2. Have you seen or consulted any other health care providers outside of the [address] [place] [address] since your last visit? Include any pap smears or colon screening.  No

## 2017-04-11 ENCOUNTER — TELEPHONE (OUTPATIENT)
Dept: FAMILY MEDICINE CLINIC | Age: 47
End: 2017-04-11

## 2017-04-11 NOTE — TELEPHONE ENCOUNTER
Difficult to say. Has she been taking potassium supplement prescribed last OV? Any sx of this when not working or lifting? Has she been drinking gatorade or other electrolyte rich drinks?

## 2017-04-11 NOTE — TELEPHONE ENCOUNTER
Pt states she has been taking her potassium as rx'd, states s/s are mostly when she is at work and using machine, does not notice them at home. States she is starting to have weakness in arms as well. Pt states she has been drinking only water. States she is at work and unable to answer phone, would like detailed message left with any recommendations.

## 2017-04-11 NOTE — TELEPHONE ENCOUNTER
Enc to keep taking the potassium and try drinking gatorade. If sx persist or worsen follow up in office next week to repeat labs and for further evaluation.

## 2017-04-11 NOTE — TELEPHONE ENCOUNTER
Patient states she is having a hard time working because of lifting, standing & walking. She has been getting real weak at work. She returned to work yesterday but feels real weak with arms & legs. Could it be from the potassium? Please advise what to do @[phone].

## 2017-06-20 ENCOUNTER — OFFICE VISIT (OUTPATIENT)
Dept: FAMILY MEDICINE CLINIC | Age: 47
End: 2017-06-20

## 2017-06-20 VITALS
DIASTOLIC BLOOD PRESSURE: 99 MMHG | HEART RATE: 92 BPM | OXYGEN SATURATION: 98 % | HEIGHT: 68 IN | BODY MASS INDEX: 28.64 KG/M2 | SYSTOLIC BLOOD PRESSURE: 155 MMHG | RESPIRATION RATE: 18 BRPM | WEIGHT: 189 LBS | TEMPERATURE: 97.9 F

## 2017-06-20 DIAGNOSIS — M54.2 ACUTE NECK PAIN: Primary | ICD-10-CM

## 2017-06-20 DIAGNOSIS — M25.512 ACUTE PAIN OF LEFT SHOULDER: ICD-10-CM

## 2017-06-20 DIAGNOSIS — E78.2 MIXED HYPERLIPIDEMIA: ICD-10-CM

## 2017-06-20 DIAGNOSIS — I10 ESSENTIAL HYPERTENSION: ICD-10-CM

## 2017-06-20 DIAGNOSIS — R94.31 ABNORMAL FINDING ON EKG: ICD-10-CM

## 2017-06-20 RX ORDER — KETOROLAC TROMETHAMINE 30 MG/ML
60 INJECTION, SOLUTION INTRAMUSCULAR; INTRAVENOUS ONCE
Qty: 1 VIAL | Refills: 0
Start: 2017-06-20 | End: 2017-06-20

## 2017-06-20 RX ORDER — HYDROCODONE BITARTRATE AND ACETAMINOPHEN 5; 325 MG/1; MG/1
1 TABLET ORAL
Qty: 20 TAB | Refills: 0 | Status: SHIPPED | OUTPATIENT
Start: 2017-06-20 | End: 2017-06-28 | Stop reason: SDUPTHER

## 2017-06-20 RX ORDER — PREDNISONE 5 MG/1
TABLET ORAL
Qty: 21 TAB | Refills: 0 | Status: SHIPPED | OUTPATIENT
Start: 2017-06-20 | End: 2017-07-18

## 2017-06-20 RX ORDER — QUETIAPINE FUMARATE 25 MG/1
TABLET, FILM COATED ORAL
Refills: 2 | COMMUNITY
Start: 2017-06-13 | End: 2017-06-20

## 2017-06-20 RX ORDER — METHOCARBAMOL 750 MG/1
750 TABLET, FILM COATED ORAL
Qty: 30 TAB | Refills: 1 | Status: SHIPPED | OUTPATIENT
Start: 2017-06-20 | End: 2017-06-28 | Stop reason: DRUGHIGH

## 2017-06-20 NOTE — PROGRESS NOTES
Chief Complaint   Patient presents with    Arm Pain     left arm    Shoulder Pain     left shoulder     Patient in office today for left shoulder pain that radiates to left arm pain;pt states she has been treating with ice/cold compresses, tylenol extra strength and BC powder; pt states she is unable to turn neck due to pain; pt states pain began Sunday. 1. Have you been to the ER, urgent care clinic since your last visit? Hospitalized since your last visit? No    2. Have you seen or consulted any other health care providers outside of the [address] [place] [address] since your last visit? Include any pap smears or colon screening.  No

## 2017-06-20 NOTE — PATIENT INSTRUCTIONS
Neck Strain: Care Instructions  Your Care Instructions  You have strained the muscles and ligaments in your neck. A sudden, awkward movement can strain the neck. This often occurs with falls or car accidents or during certain sports. Everyday activities like working on a computer or sleeping can also cause neck strain if they force you to hold your neck in an awkward position for a long time. It is common for neck pain to get worse for a day or two after an injury, but it should start to feel better after that. You may have more pain and stiffness for several days before it gets better. This is expected. It may take a few weeks or longer for it to heal completely. Good home treatment can help you get better faster and avoid future neck problems. Follow-up care is a key part of your treatment and safety. Be sure to make and go to all appointments, and call your doctor if you are having problems. It's also a good idea to know your test results and keep a list of the medicines you take. How can you care for yourself at home? · If you were given a neck brace (cervical collar) to limit neck motion, wear it as instructed for as many days as your doctor tells you to. Do not wear it longer than you were told to. Wearing a brace for too long can make neck stiffness worse and weaken the neck muscles. · You can try using heat or ice to see if it helps. ¨ Try using a heating pad on a low or medium setting for 15 to 20 minutes every 2 to 3 hours. Try a warm shower in place of one session with the heating pad. You can also buy single-use heat wraps that last up to 8 hours. ¨ You can also try an ice pack for 10 to 15 minutes every 2 to 3 hours. · Take pain medicines exactly as directed. ¨ If the doctor gave you a prescription medicine for pain, take it as prescribed. ¨ If you are not taking a prescription pain medicine, ask your doctor if you can take an over-the-counter medicine.   · Gently rub the area to relieve pain and help with blood flow. Do not massage the area if it hurts to do so. · Do not do anything that makes the pain worse. Take it easy for a couple of days. You can do your usual activities if they do not hurt your neck or put it at risk for more stress or injury. · Try sleeping on a special neck pillow. Place it under your neck, not under your head. Placing a tightly rolled-up towel under your neck while you sleep will also work. If you use a neck pillow or rolled towel, do not use your regular pillow at the same time. · To prevent future neck pain, do exercises to stretch and strengthen your neck and back. Learn how to use good posture, safe lifting techniques, and proper body mechanics. When should you call for help? Call 911 anytime you think you may need emergency care. For example, call if:  · You are unable to move an arm or a leg at all. Call your doctor now or seek immediate medical care if:  · You have new or worse symptoms in your arms, legs, chest, belly, or buttocks. Symptoms may include:  ¨ Numbness or tingling. ¨ Weakness. ¨ Pain. · You lose bladder or bowel control. Watch closely for changes in your health, and be sure to contact your doctor if:  · You are not getting better as expected. Where can you learn more? Go to http://alexa-kirby.info/. Enter M253 in the search box to learn more about \"Neck Strain: Care Instructions. \"  Current as of: March 21, 2017  Content Version: 11.3  © 4507-3592 [employer]. Care instructions adapted under license by [employer] (which disclaims liability or warranty for this information). If you have questions about a medical condition or this instruction, always ask your healthcare professional. [address] any warranty or liability for your use of this information.

## 2017-06-20 NOTE — MR AVS SNAPSHOT
Visit Information Date & Time Provider Department Dept. Phone Encounter #  
 6/20/2017 11:15 AM Venice Gordon NP [address] [facility] [phone] [id number] Follow-up Instructions Return in about 2 weeks (around 7/4/2017) for follow up and recheck BP. Upcoming Health Maintenance Date Due Pneumococcal 19-64 Medium Risk (1 of 1 - PPSV23) 9/10/1989 DTaP/Tdap/Td series (1 - Tdap) 9/10/1991 PAP AKA CERVICAL CYTOLOGY 9/10/1991 INFLUENZA AGE 9 TO ADULT 8/1/2017 Allergies as of 6/20/2017  Review Complete On: 6/20/2017 By: Venice Gordon NP No Known Allergies Current Immunizations  Never Reviewed Name Date Hep A Vaccine 6/11/2017 Hep B Vaccine 6/11/2017 Tdap 6/11/2017 Not reviewed this visit You Were Diagnosed With   
  
 Codes Comments Acute neck pain    -  Primary ICD-10-CM: M54.2 ICD-9-CM: 723.1 Acute pain of left shoulder     ICD-10-CM: M25.512 ICD-9-CM: 719.41 Mixed hyperlipidemia     ICD-10-CM: E78.2 ICD-9-CM: 272.2 Essential hypertension     ICD-10-CM: I10 
ICD-9-CM: 401.9 Abnormal finding on EKG     ICD-10-CM: R94.31 
ICD-9-CM: 794.31 Vitals BP Pulse Temp Resp Height(growth percentile) Weight(growth percentile) (!) 155/99 (BP 1 Location: Right arm, BP Patient Position: Sitting) 92 97.9 °F (36.6 °C) (Oral) 18 5' 8\" (1.727 m) 189 lb (85.7 kg) LMP SpO2 BMI OB Status Smoking Status 05/22/2017 98% 28.74 kg/m2 Having regular periods Current Every Day Smoker Vitals History BMI and BSA Data Body Mass Index Body Surface Area 28.74 kg/m 2 2.03 m 2 Preferred Pharmacy Pharmacy Name Phone Darrell Yang [id number] [facility]  [facility] [phone] Your Updated Medication List  
  
   
This list is accurate as of: 6/20/17 11:32 AM.  Always use your most recent med list.  
  
  
  
  
 amitriptyline 100 mg tablet Commonly known as:  ELAVIL Take 200 mg by mouth nightly. clonazePAM 1 mg tablet Commonly known as:  Randy Fabry Take 1 mg by mouth two (2) times a day. HYDROcodone-acetaminophen 5-325 mg per tablet Commonly known as:  Lindy Safe Take 1 Tab by mouth every eight (8) hours as needed for Pain. Max Daily Amount: 3 Tabs.  
  
 ketorolac tromethamine 60 mg/2 mL Soln Commonly known as:  TORADOL  
2 mL by IntraMUSCular route once for 1 dose. methocarbamol 750 mg tablet Commonly known as:  ROBAXIN Take 1 Tab by mouth three (3) times daily as needed. pantoprazole 20 mg tablet Commonly known as:  PROTONIX Take 1 Tab by mouth daily. potassium chloride 20 mEq tablet Commonly known as:  K-DUR, KLOR-CON Take 1 Tab by mouth two (2) times a day. predniSONE 5 mg dose pack Commonly known as:  STERAPRED See administration instruction per 5mg dose pack QUEtiapine 50 mg tablet Commonly known as:  SEROquel Take 50 mg by mouth nightly. valsartan-hydroCHLOROthiazide 320-25 mg per tablet Commonly known as:  DIOVAN-HCT Take 1 Tab by mouth daily. XANAX 1 mg tablet Generic drug:  ALPRAZolam  
Take 1 mg by mouth three (3) times daily as needed for Anxiety. Prescriptions Printed Refills HYDROcodone-acetaminophen (NORCO) 5-325 mg per tablet 0 Sig: Take 1 Tab by mouth every eight (8) hours as needed for Pain. Max Daily Amount: 3 Tabs. Class: Print Route: Oral  
  
Prescriptions Sent to Pharmacy Refills  
 predniSONE (STERAPRED) 5 mg dose pack 0 Sig: See administration instruction per 5mg dose pack Class: Normal  
 Pharmacy: [facility] [address] [facility] #: [phone]  
 methocarbamol (ROBAXIN) 750 mg tablet 1 Sig: Take 1 Tab by mouth three (3) times daily as needed.   
 Class: Normal  
 Pharmacy: [facility] [place], [address] [facility] Ph #: [phone] Route: Oral  
  
We Performed the Following AMB POC EKG ROUTINE W/ 12 LEADS, INTER & REP [97201 CPT(R)] KETOROLAC TROMETHAMINE INJ [ HCP] NE THER/PROPH/DIAG INJECTION, SUBCUT/IM [id number] CPT(R)] REFERRAL TO CARDIOLOGY [BMK31 Custom] Follow-up Instructions Return in about 2 weeks (around 7/4/2017) for follow up and recheck BP. Referral Information Referral ID Referred By Referred To  
  
 [id number] Aniyah Fajardo MD   
   [address] [facility] [place], [facility] [address] [facility] Phone: [phone] Fax: [phone] Visits Status Start Date End Date 1 New Request 6/20/17 6/20/18 If your referral has a status of pending review or denied, additional information will be sent to support the outcome of this decision. Patient Instructions Neck Strain: Care Instructions Your Care Instructions You have strained the muscles and ligaments in your neck. A sudden, awkward movement can strain the neck. This often occurs with falls or car accidents or during certain sports. Everyday activities like working on a computer or sleeping can also cause neck strain if they force you to hold your neck in an awkward position for a long time. It is common for neck pain to get worse for a day or two after an injury, but it should start to feel better after that. You may have more pain and stiffness for several days before it gets better. This is expected. It may take a few weeks or longer for it to heal completely. Good home treatment can help you get better faster and avoid future neck problems. Follow-up care is a key part of your treatment and safety.  Be sure to make and go to all appointments, and call your doctor if you are having problems. It's also a good idea to know your test results and keep a list of the medicines you take. How can you care for yourself at home? · If you were given a neck brace (cervical collar) to limit neck motion, wear it as instructed for as many days as your doctor tells you to. Do not wear it longer than you were told to. Wearing a brace for too long can make neck stiffness worse and weaken the neck muscles. · You can try using heat or ice to see if it helps. ¨ Try using a heating pad on a low or medium setting for 15 to 20 minutes every 2 to 3 hours. Try a warm shower in place of one session with the heating pad. You can also buy single-use heat wraps that last up to 8 hours. ¨ You can also try an ice pack for 10 to 15 minutes every 2 to 3 hours. · Take pain medicines exactly as directed. ¨ If the doctor gave you a prescription medicine for pain, take it as prescribed. ¨ If you are not taking a prescription pain medicine, ask your doctor if you can take an over-the-counter medicine. · Gently rub the area to relieve pain and help with blood flow. Do not massage the area if it hurts to do so. · Do not do anything that makes the pain worse. Take it easy for a couple of days. You can do your usual activities if they do not hurt your neck or put it at risk for more stress or injury. · Try sleeping on a special neck pillow. Place it under your neck, not under your head. Placing a tightly rolled-up towel under your neck while you sleep will also work. If you use a neck pillow or rolled towel, do not use your regular pillow at the same time. · To prevent future neck pain, do exercises to stretch and strengthen your neck and back. Learn how to use good posture, safe lifting techniques, and proper body mechanics. When should you call for help? Call 911 anytime you think you may need emergency care. For example, call if: 
· You are unable to move an arm or a leg at all. Call your doctor now or seek immediate medical care if: 
· You have new or worse symptoms in your arms, legs, chest, belly, or buttocks. Symptoms may include: ¨ Numbness or tingling. ¨ Weakness. ¨ Pain. · You lose bladder or bowel control. Watch closely for changes in your health, and be sure to contact your doctor if: 
· You are not getting better as expected. Where can you learn more? Go to http://alexa-kirby.info/. Enter M253 in the search box to learn more about \"Neck Strain: Care Instructions. \" Current as of: March 21, 2017 Content Version: 11.3 © 4515-3111 [employer]. Care instructions adapted under license by [employer] (which disclaims liability or warranty for this information). If you have questions about a medical condition or this instruction, always ask your healthcare professional. [place] [address] any warranty or liability for your use of this information. Introducing [facility]! [employer] introduces [employer] patient portal. Now you can access parts of your medical record, email your doctor's office, and request medication refills online. 1. In your internet browser, go to [email or web address] 2. Click on the First Time User? Click Here link in the Sign In box. You will see the New Member Sign Up page. 3. Enter your [employer] Access Code exactly as it appears below. You will not need to use this code after youve completed the sign-up process. If you do not sign up before the expiration date, you must request a new code. · [employer] Access Code: [email or web address] Expires: 9/18/2017 11:32 AM 
 
4. Enter the last four digits of your Social Security Number (xxxx) and Date of Birth (mm/dd/yyyy) as indicated and click Submit. You will be taken to the next sign-up page. 5. Create a [employer] ID.  This will be your [employer] login ID and cannot be changed, so think of one that is secure and easy to remember. 6. Create a Mem[employer]ane password. You can change your password at any time. 7. Enter your Password Reset Question and Answer. This can be used at a later time if you forget your password. 8. Enter your e-mail address. You will receive e-mail notification when new information is available in [address] 9. Click Sign Up. You can now view and download portions of your medical record. 10. Click the Download Summary menu link to download a portable copy of your medical information. If you have questions, please visit the Frequently Asked Questions section of the [employer] website. Remember, [employer] is NOT to be used for urgent needs. For medical emergencies, dial 911. Now available from your iPhone and Android! Please provide this summary of care documentation to your next provider. Your primary care clinician is listed as Shanelle Fischer. If you have any questions after today's visit, please call [phone].

## 2017-06-20 NOTE — LETTER
NOTIFICATION RETURN TO WORK 
 
6/20/2017 11:34 AM 
 
Ms. Mitesh Prado [address] [address] To Whom It May Concern: 
 
Mitesh Prado is currently under the care of [address]. She will return to work on: Monday June 26th, 2017. If there are questions or concerns please have the patient contact our office.  
 
 
 
Sincerely, 
 
 
Milly Wills NP

## 2017-06-20 NOTE — PROGRESS NOTES
Chief Complaint   Patient presents with    Arm Pain     left arm    Shoulder Pain     left shoulder     Patient in office today for left shoulder pain that radiates to left arm pain; pt states she has been treating with ice/cold compresses, tylenol extra strength and BC powder; pt states she is unable to turn neck due to pain; pt states pain began Sunday. 1. Have you been to the ER, urgent care clinic since your last visit? Hospitalized since your last visit? No    2. Have you seen or consulted any other health care providers outside of the [address] [place] [address] since your last visit? Include any pap smears or colon screening. No         First noticed pain Sunday when she woke up in the morning. Took OTCs without relief. Woke up Monday and still had a lot of pain. Left shoulder, neck chest region. Has been attempting supportive measures. Difficulty with neck ROM due to severity of pain. Pain is radiating down shoulder and arm. Has a history of tight muscles and muscle spasms but this is more painful than that. Denies any numbness or tingling in the fingers. Denies any decreased  strength. Pain can be worse with deep breaths and swallowing. Denies any cp, sob, and dyspnea. Tender when she presses on it. Denies any trauma or injury leading up to this. Does a lot of lifting at work, turning, bending but does not recall injuring. Able to move the arm but is painful. Pt was going to PT last year. Things started to feel better so she stopped going. Ache has slowly returned but now this is more of a pain. Denies any other concerns at this time. Chief Complaint   Patient presents with    Arm Pain     left arm    Shoulder Pain     left shoulder     she is a 55y.o. year old female who presents for evalution. Reviewed PmHx, RxHx, FmHx, SocHx, AllgHx and updated and dated in the chart.     Review of Systems - negative except as listed above in the HPI    Objective:     Vitals:    06/20/17 1055 06/20/17 1132   BP: (!) 153/99 (!) 155/99   Pulse: 92    Resp: 18    Temp: 97.9 °F (36.6 °C)    TempSrc: Oral    SpO2: 98%    Weight: 189 lb (85.7 kg)    Height: 5' 8\" (1.727 m)      Physical Examination: General appearance - alert, stiff, moderate amount of distress due to severity of pain  Eyes - pupils equal and reactive, extraocular eye movements intact  Ears - bilateral TM's and external ear canals normal  Nose - normal and patent, no erythema, discharge or polyps and normal nontender sinuses  Mouth - mucous membranes moist, pharynx normal without lesions  Neck - palpable tension along cervical paraspinals, neck ROM but elicits pain, reports pain with deep palpation of paraspinals and bilateral trapezius muscles  Chest - clear to auscultation, no wheezes, rales or rhonchi, symmetric air entry  Heart - normal rate, regular rhythm, normal S1, S2, no murmurs  Musculoskeletal - abnormal exam of left shoulder, tension at supraspinatus and infraspinatus regions, shoulder rom intact    Assessment/ Plan:   Kylie Cavanaugh was seen today for arm pain and shoulder pain. Diagnoses and all orders for this visit:    Acute neck pain  -     ketorolac tromethamine (TORADOL) 60 mg/2 mL soln; 2 mL by IntraMUSCular route once for 1 dose. -     KETOROLAC TROMETHAMINE INJ  -     DE THER/PROPH/DIAG INJECTION, SUBCUT/IM  -     predniSONE (STERAPRED) 5 mg dose pack; See administration instruction per 5mg dose pack  -     methocarbamol (ROBAXIN) 750 mg tablet; Take 1 Tab by mouth three (3) times daily as needed. -     HYDROcodone-acetaminophen (NORCO) 5-325 mg per tablet; Take 1 Tab by mouth every eight (8) hours as needed for Pain. Max Daily Amount: 3 Tabs. toradol given. Start med regimen to calm down inflammation and pain. Reinforced SEs/ADRs and how to take responsibly. Enc to alternate heat and ice.  Rest for 24 hours then start stretching and exercising to strengthen the neck muscles and prevent further injury. Massage painful area to relieve muscle tension. OTC NSAID for pain/inflammation. Work on good body mechanics, avoid heavy lifting. RTC if sx persist or worsen. Acute pain of left shoulder  -     AMB POC EKG ROUTINE W/ 12 LEADS, INTER & REP  Sx and presentation seem musculoskeletal in origin. Mixed hyperlipidemia / Essential hypertension / Abnormal finding on EKG  -     REFERRAL TO CARDIOLOGY  Referral to cardiology for further evaluation due to changes on EKG and comorbidities. Follow-up Disposition:  Return in about 2 weeks (around 7/4/2017) for follow up and recheck BP. I have discussed the diagnosis with the patient and the intended plan as seen in the above orders. The patient has received an after-visit summary and questions were answered concerning future plans. Medication Side Effects and Warnings were discussed with patient: yes  Patient Labs were reviewed and or requested: yes  Patient Past Records were reviewed and or requested  yes  Patient / Caregiver Understanding of treatment plan was verbalized during office visit YES    DANK [place]    Patient Instructions        Neck Strain: Care Instructions  Your Care Instructions  You have strained the muscles and ligaments in your neck. A sudden, awkward movement can strain the neck. This often occurs with falls or car accidents or during certain sports. Everyday activities like working on a computer or sleeping can also cause neck strain if they force you to hold your neck in an awkward position for a long time. It is common for neck pain to get worse for a day or two after an injury, but it should start to feel better after that. You may have more pain and stiffness for several days before it gets better. This is expected. It may take a few weeks or longer for it to heal completely. Good home treatment can help you get better faster and avoid future neck problems.   Follow-up care is a key part of your treatment and safety. Be sure to make and go to all appointments, and call your doctor if you are having problems. It's also a good idea to know your test results and keep a list of the medicines you take. How can you care for yourself at home? · If you were given a neck brace (cervical collar) to limit neck motion, wear it as instructed for as many days as your doctor tells you to. Do not wear it longer than you were told to. Wearing a brace for too long can make neck stiffness worse and weaken the neck muscles. · You can try using heat or ice to see if it helps. ¨ Try using a heating pad on a low or medium setting for 15 to 20 minutes every 2 to 3 hours. Try a warm shower in place of one session with the heating pad. You can also buy single-use heat wraps that last up to 8 hours. ¨ You can also try an ice pack for 10 to 15 minutes every 2 to 3 hours. · Take pain medicines exactly as directed. ¨ If the doctor gave you a prescription medicine for pain, take it as prescribed. ¨ If you are not taking a prescription pain medicine, ask your doctor if you can take an over-the-counter medicine. · Gently rub the area to relieve pain and help with blood flow. Do not massage the area if it hurts to do so. · Do not do anything that makes the pain worse. Take it easy for a couple of days. You can do your usual activities if they do not hurt your neck or put it at risk for more stress or injury. · Try sleeping on a special neck pillow. Place it under your neck, not under your head. Placing a tightly rolled-up towel under your neck while you sleep will also work. If you use a neck pillow or rolled towel, do not use your regular pillow at the same time. · To prevent future neck pain, do exercises to stretch and strengthen your neck and back. Learn how to use good posture, safe lifting techniques, and proper body mechanics. When should you call for help? Call 911 anytime you think you may need emergency care.  For example, call if:  · You are unable to move an arm or a leg at all. Call your doctor now or seek immediate medical care if:  · You have new or worse symptoms in your arms, legs, chest, belly, or buttocks. Symptoms may include:  ¨ Numbness or tingling. ¨ Weakness. ¨ Pain. · You lose bladder or bowel control. Watch closely for changes in your health, and be sure to contact your doctor if:  · You are not getting better as expected. Where can you learn more? Go to http://alexa-kirby.info/. Enter M253 in the search box to learn more about \"Neck Strain: Care Instructions. \"  Current as of: March 21, 2017  Content Version: 11.3  © 4406-6774 [employer]. Care instructions adapted under license by [employer] (which disclaims liability or warranty for this information). If you have questions about a medical condition or this instruction, always ask your healthcare professional. [place] [address] any warranty or liability for your use of this information.

## 2017-06-28 ENCOUNTER — OFFICE VISIT (OUTPATIENT)
Dept: FAMILY MEDICINE CLINIC | Age: 47
End: 2017-06-28

## 2017-06-28 VITALS
DIASTOLIC BLOOD PRESSURE: 96 MMHG | HEART RATE: 117 BPM | BODY MASS INDEX: 28.04 KG/M2 | TEMPERATURE: 98.6 F | SYSTOLIC BLOOD PRESSURE: 138 MMHG | HEIGHT: 68 IN | RESPIRATION RATE: 16 BRPM | OXYGEN SATURATION: 98 % | WEIGHT: 185 LBS

## 2017-06-28 DIAGNOSIS — M54.6 ACUTE MIDLINE THORACIC BACK PAIN: ICD-10-CM

## 2017-06-28 DIAGNOSIS — M62.838 TRAPEZIUS MUSCLE SPASM: ICD-10-CM

## 2017-06-28 DIAGNOSIS — G89.29 CHRONIC NECK PAIN: Primary | ICD-10-CM

## 2017-06-28 DIAGNOSIS — M54.2 CHRONIC NECK PAIN: Primary | ICD-10-CM

## 2017-06-28 RX ORDER — HYDROCODONE BITARTRATE AND ACETAMINOPHEN 5; 325 MG/1; MG/1
1 TABLET ORAL
Qty: 40 TAB | Refills: 0 | Status: SHIPPED | OUTPATIENT
Start: 2017-06-28 | End: 2017-08-15 | Stop reason: ALTCHOICE

## 2017-06-28 RX ORDER — METHOCARBAMOL 750 MG/1
750 TABLET, FILM COATED ORAL 4 TIMES DAILY
Qty: 80 TAB | Refills: 0 | Status: SHIPPED | OUTPATIENT
Start: 2017-06-28 | End: 2017-08-15 | Stop reason: ALTCHOICE

## 2017-06-28 NOTE — PROGRESS NOTES
1. Have you been to the ER, urgent care clinic since your last visit? Hospitalized since your last visit? No    2. Have you seen or consulted any other health care providers outside of the [address] [place] [address] since your last visit? Include any pap smears or colon screening.  No     Chief Complaint   Patient presents with    Shoulder Pain     Shoulder and Neck pain, ongoing for 20 years, flare up x10 days

## 2017-06-28 NOTE — PROGRESS NOTES
Chief Complaint   Patient presents with    Shoulder Pain     Shoulder and Neck pain, ongoing for 20 years, flare up x10 days     she is a 55y.o. year old female who presents for evalution. Pt has hx of neck and upper back/shoulder pain. Ptwas seen here last week and given medication which helped (doesn't feel like steroids helped but muscle relaxer and pain medication helped). Then did some activities around the house Saturday and seemed to flare up again. Then woke up with pain on Monday, went back to work - had to go see Nurse due to pain who gave her Tylenol. Does a lot of lifting and moving. Had to leave early Monday due to this pain - went home and took muscle relaxer and pain medication which slightly helped. Pt has been out of work past 2 days. Pt states even with resting pain has continued - worse when sits up or when tries driving. Review of chart shows normal CT of C-spine last year. Pt supposed to leave for [place] Sunday for vacation, worried about being in pain and not able to enjoy vacation. Needs [facility] paperwork filled out for 6/20-6/26 and then went back 26 and had to leave. Reviewed PmHx, RxHx, FmHx, SocHx, AllgHx and updated and dated in the chart.     Review of Systems - negative except as listed above in the HPI    Objective:     Vitals:    06/28/17 1457   BP: (!) 138/96   Pulse: (!) 117   Resp: 16   Temp: 98.6 °F (37 °C)   TempSrc: Oral   SpO2: 98%   Weight: 185 lb (83.9 kg)   Height: 5' 8\" (1.727 m)     Physical Examination: General appearance - alert, well appearing, and in mild to moderate distress  Neck - supple, no significant adenopathy, decreased ROM, bilateral trap tightness, no tenderness, movements painful   Chest - clear to auscultation, no wheezes, rales or rhonchi, symmetric air entry  Heart - normal rate, regular rhythm, normal S1, S2, no murmurs, rubs, clicks or gallops  Back exam - tenderness noted thoracic with paraspinous muscle spasms L>R Assessment/ Plan:   Edgardo Reed was seen today for shoulder pain. Diagnoses and all orders for this visit:    Chronic neck pain  -     methocarbamol (ROBAXIN) 750 mg tablet; Take 1 Tab by mouth four (4) times daily.  -     HYDROcodone-acetaminophen (NORCO) 5-325 mg per tablet; Take 1 Tab by mouth every eight (8) hours as needed for Pain. Max Daily Amount: 3 Tabs. -     REFERRAL TO ORTHOPEDIC SURGERY  -     XR SPINE CERV 4 OR 5 V; Future  -     XR SPINE THORAC 3 V; Future  Increase frequency of Robaxin, refill provided on Norco with understanding we will not continue to fill and should last patient long term. Obtain x-rays and F/U with Ortho. Discussed pt returning to PT but pt not interested at this time. Activity modification, application of heat and ice. Acute midline thoracic back pain  -     methocarbamol (ROBAXIN) 750 mg tablet; Take 1 Tab by mouth four (4) times daily.  -     HYDROcodone-acetaminophen (NORCO) 5-325 mg per tablet; Take 1 Tab by mouth every eight (8) hours as needed for Pain. Max Daily Amount: 3 Tabs. -     REFERRAL TO ORTHOPEDIC SURGERY  -     XR SPINE CERV 4 OR 5 V; Future  -     XR SPINE THORAC 3 V; Future    Trapezius muscle spasm  Increase freq of rx. Pt voiced understanding regarding plan of care. Follow-up Disposition:  Return if symptoms worsen or fail to improve. I have discussed the diagnosis with the patient and the intended plan as seen in the above orders. The patient has received an after-visit summary and questions were answered concerning future plans.      Medication Side Effects and Warnings were discussed with patient    Ange Rodriguez NP

## 2017-06-28 NOTE — MR AVS SNAPSHOT
Visit Information Date & Time Provider Department Dept. Phone Encounter #  
 6/28/2017  3:00 PM Shilpa Cotton NP [facility] [phone] [id number] Follow-up Instructions Return if symptoms worsen or fail to improve. Upcoming Health Maintenance Date Due Pneumococcal 19-64 Medium Risk (1 of 1 - PPSV23) 9/10/1989 PAP AKA CERVICAL CYTOLOGY 9/10/1991 INFLUENZA AGE 9 TO ADULT 8/1/2017 DTaP/Tdap/Td series (2 - Td) 6/11/2027 Allergies as of 6/28/2017  Review Complete On: 6/28/2017 By: Shilpa Cotton NP No Known Allergies Current Immunizations  Never Reviewed Name Date Hep A Vaccine 6/11/2017 Hep B Vaccine 6/11/2017 Tdap 6/11/2017 Not reviewed this visit You Were Diagnosed With   
  
 Codes Comments Chronic neck pain    -  Primary ICD-10-CM: M54.2, G89.29 ICD-9-CM: 723.1, 338.29 Acute midline thoracic back pain     ICD-10-CM: M54.6 ICD-9-CM: 724.1 Trapezius muscle spasm     ICD-10-CM: K66.035 ICD-9-CM: 728.85 Vitals BP Pulse Temp Resp Height(growth percentile) Weight(growth percentile) (!) 138/96 (!) 117 98.6 °F (37 °C) (Oral) 16 5' 8\" (1.727 m) 185 lb (83.9 kg) LMP SpO2 BMI OB Status Smoking Status 05/22/2017 98% 28.13 kg/m2 Having regular periods Current Every Day Smoker Vitals History BMI and BSA Data Body Mass Index Body Surface Area  
 28.13 kg/m 2 2.01 m 2 Preferred Pharmacy Pharmacy Name Phone Darrell Yang [address], [address] AT [facility] OF  [facility] [phone] Your Updated Medication List  
  
   
This list is accurate as of: 6/28/17  3:22 PM.  Always use your most recent med list.  
  
  
  
  
 amitriptyline 100 mg tablet Commonly known as:  ELAVIL Take 200 mg by mouth nightly. clonazePAM 1 mg tablet Commonly known as:  Ozzy Resides Take 1 mg by mouth two (2) times a day. HYDROcodone-acetaminophen 5-325 mg per tablet Commonly known as:  Salud Holiday Take 1 Tab by mouth every eight (8) hours as needed for Pain. Max Daily Amount: 3 Tabs. methocarbamol 750 mg tablet Commonly known as:  ROBAXIN Take 1 Tab by mouth four (4) times daily. pantoprazole 20 mg tablet Commonly known as:  PROTONIX Take 1 Tab by mouth daily. potassium chloride 20 mEq tablet Commonly known as:  K-DUR, KLOR-CON Take 1 Tab by mouth two (2) times a day. predniSONE 5 mg dose pack Commonly known as:  STERAPRED See administration instruction per 5mg dose pack QUEtiapine 50 mg tablet Commonly known as:  SEROquel Take 50 mg by mouth nightly. valsartan-hydroCHLOROthiazide 320-25 mg per tablet Commonly known as:  DIOVAN-HCT Take 1 Tab by mouth daily. XANAX 1 mg tablet Generic drug:  ALPRAZolam  
Take 1 mg by mouth three (3) times daily as needed for Anxiety. Prescriptions Printed Refills HYDROcodone-acetaminophen (NORCO) 5-325 mg per tablet 0 Sig: Take 1 Tab by mouth every eight (8) hours as needed for Pain. Max Daily Amount: 3 Tabs. Class: Print Route: Oral  
  
Prescriptions Sent to Pharmacy Refills  
 methocarbamol (ROBAXIN) 750 mg tablet 0 Sig: Take 1 Tab by mouth four (4) times daily. Class: Normal  
 Pharmacy: [facility], [address] [facility] #: [phone] Route: Oral  
  
We Performed the Following REFERRAL TO ORTHOPEDIC SURGERY [REF62 Custom] Comments:  
 Please evaluate patient for neck and upper back pain Follow-up Instructions Return if symptoms worsen or fail to improve. To-Do List   
 06/28/2017 Imaging:  XR SPINE CERV 4 OR 5 V   
  
 06/28/2017 Imaging:  XR SPINE THORAC 3 V Referral Information Referral ID Referred By Referred To  
  
 [id number] Andrae Randle [facility] [address] [place] [address] Phone: [phone] Fax: [phone] Visits Status Start Date End Date 1 New Request 6/28/17 6/28/18 If your referral has a status of pending review or denied, additional information will be sent to support the outcome of this decision. Introducing \[facility]\"" & HEALTH SERVICES! Brenda Cleverly introduces [facility] patient portal. Now you can access parts of your medical record, email your doctor's office, and request medication refills online. 1. In your internet browser, go to [email or web address]. [email or web address] 2. Click on the First Time User? Click Here link in the Sign In box. You will see the New Member Sign Up page. 3. Enter your [employer] Access Code exactly as it appears below. You will not need to use this code after youve completed the sign-up process. If you do not sign up before the expiration date, you must request a new code. · [employer] Access Code: [email or web address] Expires: 9/18/2017 11:32 AM 
 
4. Enter the last four digits of your Social Security Number (xxxx) and Date of Birth (mm/dd/yyyy) as indicated and click Submit. You will be taken to the next sign-up page. 5. Create a [facility] ID. This will be your [facility] login ID and cannot be changed, so think of one that is secure and easy to remember. 6. Create a [facility] password. You can change your password at any time. 7. Enter your Password Reset Question and Answer. This can be used at a later time if you forget your password. 8. Enter your e-mail address. You will receive e-mail notification when new information is available in [address] 9. Click Sign Up. You can now view and download portions of your medical record. 10. Click the Download Summary menu link to download a portable copy of your medical information.  
 
If you have questions, please visit the Frequently Asked Questions section of the [email or web address]. Remember, [employer] is NOT to be used for urgent needs. For medical emergencies, dial 911. Now available from your iPhone and Android! Please provide this summary of care documentation to your next provider. Your primary care clinician is listed as Jennye Bosworth. If you have any questions after today's visit, please call [phone].

## 2017-06-29 ENCOUNTER — HOSPITAL ENCOUNTER (OUTPATIENT)
Dept: GENERAL RADIOLOGY | Age: 47
Discharge: HOME | End: 2017-06-29
Attending: NURSE PRACTITIONER
Payer: COMMERCIAL

## 2017-06-29 ENCOUNTER — DOCUMENTATION ONLY (OUTPATIENT)
Dept: FAMILY MEDICINE CLINIC | Age: 47
End: 2017-06-29

## 2017-06-29 DIAGNOSIS — G89.29 CHRONIC NECK PAIN: ICD-10-CM

## 2017-06-29 DIAGNOSIS — M54.2 CHRONIC NECK PAIN: ICD-10-CM

## 2017-06-29 DIAGNOSIS — M54.6 ACUTE MIDLINE THORACIC BACK PAIN: ICD-10-CM

## 2017-06-29 PROCEDURE — 72072 X-RAY EXAM THORAC SPINE 3VWS: CPT

## 2017-06-29 PROCEDURE — 72050 X-RAY EXAM NECK SPINE 4/5VWS: CPT

## 2017-07-18 ENCOUNTER — OFFICE VISIT (OUTPATIENT)
Dept: FAMILY MEDICINE CLINIC | Age: 47
End: 2017-07-18

## 2017-07-18 VITALS
OXYGEN SATURATION: 98 % | HEART RATE: 106 BPM | DIASTOLIC BLOOD PRESSURE: 77 MMHG | RESPIRATION RATE: 18 BRPM | WEIGHT: 186 LBS | HEIGHT: 68 IN | BODY MASS INDEX: 28.19 KG/M2 | SYSTOLIC BLOOD PRESSURE: 140 MMHG | TEMPERATURE: 98.5 F

## 2017-07-18 DIAGNOSIS — G89.29 ACUTE EXACERBATION OF CHRONIC LOW BACK PAIN: ICD-10-CM

## 2017-07-18 DIAGNOSIS — M51.36 DDD (DEGENERATIVE DISC DISEASE), LUMBAR: Primary | ICD-10-CM

## 2017-07-18 DIAGNOSIS — M54.50 ACUTE EXACERBATION OF CHRONIC LOW BACK PAIN: ICD-10-CM

## 2017-07-18 NOTE — PROGRESS NOTES
Chief Complaint   Patient presents with    Back Pain    Neck Pain     Patient in office today for back pain and neck pain that has not improved since lov;pt states she will see  tomorrow. Pt has been treating pain with hydrocodone. 1. Have you been to the ER, urgent care clinic since your last visit? Hospitalized since your last visit? No    2. Have you seen or consulted any other health care providers outside of the [address] [place] [address] since your last visit? Include any pap smears or colon screening.  No

## 2017-07-18 NOTE — PROGRESS NOTES
Chief Complaint   Patient presents with    Back Pain    Neck Pain     Patient in office today for back pain and neck pain that has not improved since lov;pt states she will see  tomorrow. Pt has been treating pain with hydrocodone. 1. Have you been to the ER, urgent care clinic since your last visit? Hospitalized since your last visit? No    2. Have you seen or consulted any other health care providers outside of the [address] [place] [address] since your last visit? Include any pap smears or colon screening. No    Pt attempted to go back to work on 6/26 but had to leave due to severity of pain. Pt states \"I had a relapse. \"  Pt lifts 50 lb items throughout the day all day. Pt has an appt with Dr. Jessica Lopez tomorrow. Has previously received an epidural. Thinks she is going to need an MRI due to persistent pain and muscle spasms present in the upper and lower spine. Pt denies any relief of sx with medication regimen prescribed by myself and Jovany Snow. States only has pain relief when she is not doing anything. Example is trying to clean her bathroom and having to stop due to severity of pain. Pt would like to see PT for her upper back. Denies any other concerns at this time. Chief Complaint   Patient presents with    Back Pain    Neck Pain     she is a 55y.o. year old female who presents for evalution. Reviewed PmHx, RxHx, FmHx, SocHx, AllgHx and updated and dated in the chart.     Review of Systems - negative except as listed above in the HPI    Objective:     Vitals:    07/18/17 1317   BP: 140/77   Pulse: (!) 106   Resp: 18   Temp: 98.5 °F (36.9 °C)   TempSrc: Oral   SpO2: 98%   Weight: 186 lb (84.4 kg)   Height: 5' 8\" (1.727 m)     Physical Examination: General appearance - alert, well appearing, and in no distress  Chest - clear to auscultation, no wheezes, rales or rhonchi, symmetric air entry  Heart - normal rate, regular rhythm, normal S1, S2, no murmurs  Back exam - full range of motion, palpable spasm along bilateral lumbar paraspinals, negative straight-leg raise bilaterally but reports pain in the low back with flexion of right and left legs, normal reflexes and strength bilateral lower extremities, sensory exam intact bilateral lower extremities  Musculoskeletal - neck ROM intact  Extremities - peripheral pulses normal, no edema, no clubbing or cyanosis  Skin - normal coloration and turgor, no rashes, no suspicious skin lesions noted    Assessment/ Plan:   Lucas Aj was seen today for back pain and neck pain. Diagnoses and all orders for this visit:    DDD (degenerative disc disease), lumbar / Acute exacerbation of chronic low back pain  Recommended pt see Dr. Sherin Marrero first to determine what should be done to address low back pain exacerbation. Recommended she discuss a RTW date with him during [address] tomorrow and let me know what is decided prior to me completing her short term disability. Pt requested refill of her pain medication, advised she would need to discuss pain medication / management with Dr. Sherin Marrero tomorrow. Follow-up Disposition:  Return if symptoms worsen or fail to improve. I have discussed the diagnosis with the patient and the intended plan as seen in the above orders. The patient has received an after-visit summary and questions were answered concerning future plans. Medication Side Effects and Warnings were discussed with patient: yes  Patient Labs were reviewed and or requested: no  Patient Past Records were reviewed and or requested  yes  Patient / Caregiver Understanding of treatment plan was verbalized during office visit YES    DANK Plata    There are no Patient Instructions on file for this visit.

## 2017-07-19 ENCOUNTER — HOSPITAL ENCOUNTER (EMERGENCY)
Age: 47
Discharge: HOME | End: 2017-07-19
Attending: EMERGENCY MEDICINE
Payer: COMMERCIAL

## 2017-07-19 VITALS
HEIGHT: 64 IN | HEART RATE: 107 BPM | OXYGEN SATURATION: 95 % | TEMPERATURE: 98.6 F | SYSTOLIC BLOOD PRESSURE: 141 MMHG | WEIGHT: 187.39 LBS | DIASTOLIC BLOOD PRESSURE: 79 MMHG | BODY MASS INDEX: 31.99 KG/M2 | RESPIRATION RATE: 16 BRPM

## 2017-07-19 DIAGNOSIS — M54.50 ACUTE MIDLINE LOW BACK PAIN WITHOUT SCIATICA: Primary | ICD-10-CM

## 2017-07-19 PROCEDURE — 99283 EMERGENCY DEPT VISIT LOW MDM: CPT

## 2017-07-19 NOTE — ED PROVIDER NOTES
HPI Comments: Pt here with low back pain x 3 weeks - flared up after a plane trip to [place]  Had this once 5 yrs ago  Had MRI then  Had an MARTINEZ which helped tremendously  Trying norco and robaxin without much relief  Hurting day and night  Continues to smoke - 1 ppd x 20 yrs. No fevers  no hx of ivdda - questioned alone. No sx of urinary retention  No focal numbness/weakness  Has DDD in her L spine by hx  No recent illnesses. No CP, abd pain, nvdc  Has seen PCP several times since her L spine flared  Off work x 1 week, went back and pain got worse. Upper back pain is mild right now. 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 No perineal/groin numbness. Old chart reviewed - saw NP yesterday in the office, noted to have appt with Dr Kit Birmingham today. Had xrays end of June and these were normal - C and T spine. Patient is a 55 y.o. female presenting with back pain. The history is provided by the patient. No  was used. Back Pain    This is a recurrent problem. The current episode started more than 1 week ago (3 weeks ago). The problem has been gradually worsening. The problem occurs daily. Patient reports not work related injury. The pain is present in the lumbar spine. The quality of the pain is described as shooting and similar to previous episodes (similar to 5 yrs ago). The pain is at a severity of 8/10. The symptoms are aggravated by bending, certain positions and twisting.  Pertinent negatives include no chest pain, no fever, no numbness, no weight loss, no headaches, no abdominal pain, no abdominal swelling, no bowel incontinence, no perianal numbness, no bladder incontinence, no dysuria, no pelvic pain, no leg pain, no paresthesias, no paresis, no tingling and no weakness. Past Medical History:   Diagnosis Date    Hypertension     Pre-diabetes        History reviewed. No pertinent surgical history. Family History:   Problem Relation Age of Onset    Family history unknown: Yes       Social History     Social History    Marital status: SINGLE     Spouse name: N/A    Number of children: N/A    Years of education: N/A     Occupational History    Not on file. Social History Main Topics    Smoking status: Current Every Day Smoker     Types: Cigarettes    Smokeless tobacco: Current User      Comment: vapor    Alcohol use Yes      Comment: occassionally    Drug use: Not on file    Sexual activity: Not on file     Other Topics Concern    Not on file     Social History Narrative         ALLERGIES: Review of patient's allergies indicates no known allergies. Review of Systems   Constitutional: Negative for fever and weight loss. Cardiovascular: Negative for chest pain. Gastrointestinal: Negative for abdominal pain and bowel incontinence. Genitourinary: Negative for bladder incontinence, dysuria and pelvic pain. Musculoskeletal: Positive for back pain. Neurological: Negative for tingling, weakness, numbness, headaches and paresthesias. All other systems reviewed and are negative. Vitals:    07/19/17 1055   BP: 141/79   Pulse: (!) 107   Resp: 16   Temp: 98.6 °F (37 °C)   SpO2: 95%   Weight: 85 kg (187 lb 6.3 oz)   Height: 5' 4\" (1.626 m)            Physical Exam   Constitutional: She appears well-developed and well-nourished. No distress. HENT:   Head: Normocephalic. Nose: Nose normal.   Mouth/Throat: Oropharynx is clear and moist.   Eyes: Conjunctivae are normal. Pupils are equal, round, and reactive to light. Neck: No tracheal deviation present. Cardiovascular: Normal rate, regular rhythm, normal heart sounds and intact distal pulses.     Pulmonary/Chest: Effort normal and breath sounds normal.   Abdominal: Soft. She exhibits no distension. There is no tenderness. There is no rebound and no guarding. Musculoskeletal: Normal range of motion. She exhibits no edema. Legs:  Neurological: She is alert. She has normal strength. No sensory deficit. Coordination normal.   Reflex Scores:       Patellar reflexes are 2+ on the right side and 2+ on the left side. Achilles reflexes are 2+ on the right side and 2+ on the left side. Able to walk on toes and heels   Skin: Skin is warm and dry. She is not diaphoretic. Psychiatric: She has a normal mood and affect. [facility]  ED Course       Procedures      Discussed with KUN Burden on call - he has discussed with secretarial staff - appt made for 2:30 tomorrow with Dr [employer]. No need for emergent MRI  Plan:  ME home  Cont meds  appt with Dr [employer] 2:30 tomorrow      Pt was very tearful and upset here. She is upset about the way she was treated at her PCP's office yesterday. She felt degraded and felt like they thought she didn't want to work. She ultimately calmed down and left here. No further workup right now. Will see Spine tomorrow.

## 2017-07-19 NOTE — ED NOTES
The patient was discharged home by Dr. Opal Guerrero and Maddi Fine RN in stable condition, accompanied by parent/guardian . The patient is alert and oriented, is in no respiratory distress. The patient's diagnosis, condition and treatment were explained to patient or parent/guardian. The patient/responsible party expressed understanding. No prescriptions given to pt. No work/school note given to pt. A discharge plan has been developed. A  was not involved in the process. Aftercare instructions were given to the patient.

## 2017-07-19 NOTE — ED TRIAGE NOTES
Pt ambulatory to treatment area with c/o \"low back pain that started last week and I had an appt to see Dr. Amarilis Alas today but they sent me to the wrong location. \"  Pt states \" they couldn't see me again until August so I came here to see if I could get some relief. \"  Pt states \"i have DDD and have been taking muscle relaxers and norco but I haven't had anything today. \"  No fevers, urinary symptoms, injury to area, loss of bowel or bladder.

## 2017-07-19 NOTE — DISCHARGE INSTRUCTIONS
We hope that we have addressed all of your medical concerns. The examination and treatment you received in the Emergency Department were for an emergent problem and were not intended as complete care. It is important that you follow up with your healthcare provider(s) for ongoing care. If your symptoms worsen or do not improve as expected, and you are unable to reach your usual health care provider(s), you should return to the Emergency Department. Today's healthcare is undergoing tremendous change, and patient satisfaction surveys are one of the many tools to assess the quality of medical care. You may receive a survey from the [facility] regarding your experience in the Emergency Department. I hope that your experience has been completely positive, particularly the medical care that I provided. As such, please participate in the survey; anything less than excellent does not meet my expectations or intentions. [facility] and [address] [place] [address] participate in nationally recognized quality of care measures. If your blood pressure is greater than 120/80, as reported below, we urge that you seek medical care to address the potential of high blood pressure, commonly known as hypertension. Hypertension can be hereditary or can be caused by certain medical conditions, pain, stress, or \"white coat syndrome. \"       Please make an appointment with your health care provider(s) for follow up of your Emergency Department visit. VITALS:   Patient Vitals for the past 8 hrs:   Temp Pulse Resp BP SpO2   07/19/17 1055 98.6 °F (37 °C) (!) 107 16 141/79 95 %          Thank you for allowing us to provide you with medical care today. We realize that you have many choices for your emergency care needs. Please choose us in the future for any continued health care needs.       Regards,           Mercedez Lopez, DO    [employer] Office: [phone]            No results found for this or any previous visit (from the past 24 hour(s)). No results found. Learning About How to Have a Healthy Back  What causes back pain? Back pain is often caused by overuse, strain, or injury. For example, people often hurt their backs playing sports or working in the yard, being jolted in a car accident, or lifting something too heavy. Aging plays a part too. Your bones and muscles tend to lose strength as you age, which makes injury more likely. The spongy discs between the bones of the spine (vertebrae) may suffer from wear and tear and no longer provide enough cushion between the bones. A disc that bulges or breaks open (herniated disc) can press on nerves, causing back pain. In some people, back pain is the result of arthritis, broken vertebrae caused by bone loss (osteoporosis), illness, or a spine problem. Although most people have back pain at one time or another, there are steps you can take to make it less likely. How can you have a healthy back? Reduce stress on your back through good posture  Slumping or slouching alone may not cause low back pain. But after the back has been strained or injured, bad posture can make pain worse. · Sleep in a position that maintains your back's normal curves and on a mattress that feels comfortable. Sleep on your side with a pillow between your knees, or sleep on your back with a pillow under your knees. These positions can reduce strain on your back. · Stand and sit up straight. \"Good posture\" generally means your ears, shoulders, and hips are in a straight line. · If you must stand for a long time, put one foot on a stool, ledge, or box. Switch feet every now and then. · Sit in a chair that is low enough to let you place both feet flat on the floor with both knees nearly level with your hips. If your chair or desk is too high, use a footrest to raise your knees.  Place a small pillow, a rolled-up towel, or a lumbar roll in the curve of your back if you need extra support. · Try a kneeling chair, which helps tilt your hips forward. This takes pressure off your lower back. · Try sitting on an exercise ball. It can rock from side to side, which helps keep your back loose. · When driving, keep your knees nearly level with your hips. Sit straight, and drive with both hands on the steering wheel. Your arms should be in a slightly bent position. Reduce stress on your back through careful lifting  · Squat down, bending at the hips and knees only. If you need to, put one knee to the floor and extend your other knee in front of you, bent at a right angle (half kneeling). · Press your chest straight forward. This helps keep your upper back straight while keeping a slight arch in your low back. · Hold the load as close to your body as possible, at the level of your belly button (navel). · Use your feet to change direction, taking small steps. · Lead with your hips as you change direction. Keep your shoulders in line with your hips as you move. · Set down your load carefully, squatting with your knees and hips only. Exercise and stretch your back  · Do some exercise on most days of the week, if your doctor says it is okay. You can walk, run, swim, or cycle. · Stretch your back muscles. Here are a few exercises to try:  [place] on your back, and gently pull one bent knee to your chest. Put that foot back on the floor, and then pull the other knee to your chest.  ¨ Do pelvic tilts. Lie on your back with your knees bent. Tighten your stomach muscles. Pull your belly button (navel) in and up toward your ribs. You should feel like your back is pressing to the floor and your hips and pelvis are slightly lifting off the floor. Hold for 6 seconds while breathing smoothly. ¨ Sit with your back flat against a wall. · Keep your core muscles strong.  The muscles of your back, belly (abdomen), and buttocks support your spine. ¨ Pull in your belly and imagine pulling your navel toward your spine. Hold this for 6 seconds, then relax. Remember to keep breathing normally as you tense your muscles. ¨ Do curl-ups. Always do them with your knees bent. Keep your low back on the floor, and curl your shoulders toward your knees using a smooth, slow motion. Keep your arms folded across your chest. If this bothers your neck, try putting your hands behind your neck (not your head), with your elbows spread apart. ¨ Lie on your back with your knees bent and your feet flat on the floor. Tighten your belly muscles, and then push with your feet and raise your buttocks up a few inches. Hold this position 6 seconds as you continue to breathe normally, then lower yourself slowly to the floor. Repeat 8 to 12 times. ¨ If you like group exercise, try Pilates or yoga. These classes have poses that strengthen the core muscles. Lead a healthy lifestyle  · Stay at a healthy weight to avoid strain on your back. · Do not smoke. Smoking increases the risk of osteoporosis, which weakens the spine. If you need help quitting, talk to your doctor about stop-smoking programs and medicines. These can increase your chances of quitting for good. Where can you learn more? Go to http://alexa-kirby.info/. Enter L315 in the search box to learn more about \"Learning About How to Have a Healthy Back. \"  Current as of: March 21, 2017  Content Version: 11.3  © 7990-9522 [employer]. Care instructions adapted under license by [employer] (which disclaims liability or warranty for this information). If you have questions about a medical condition or this instruction, always ask your healthcare professional. [place] [address] any warranty or liability for your use of this information.        Low Back Pain: Exercises  Your Care Instructions  Here are some examples of typical rehabilitation exercises for your condition. Start each exercise slowly. Ease off the exercise if you start to have pain. Your doctor or physical therapist will tell you when you can start these exercises and which ones will work best for you. How to do the exercises  Press-up    1. Lie on your stomach, supporting your body with your forearms. 2. Press your elbows down into the floor to raise your upper back. As you do this, relax your stomach muscles and allow your back to arch without using your back muscles. As your press up, do not let your hips or pelvis come off the floor. 3. Hold for 15 to 30 seconds, then relax. 4. Repeat 2 to 4 times. Alternate arm and leg (bird dog) exercise    Note: Do this exercise slowly. Try to keep your body straight at all times, and do not let one hip drop lower than the other. 1. Start on the floor, on your hands and knees. 2. Tighten your belly muscles. 3. Raise one leg off the floor, and hold it straight out behind you. Be careful not to let your hip drop down, because that will twist your trunk. 4. Hold for about 6 seconds, then lower your leg and switch to the other leg. 5. Repeat 8 to 12 times on each leg. 6. Over time, work up to holding for 10 to 30 seconds each time. 7. If you feel stable and secure with your leg raised, try raising the opposite arm straight out in front of you at the same time. Knee-to-chest exercise    1. Lie on your back with your knees bent and your feet flat on the floor. 2. Bring one knee to your chest, keeping the other foot flat on the floor (or keeping the other leg straight, whichever feels better on your lower back). 3. Keep your lower back pressed to the floor. Hold for at least 15 to 30 seconds. 4. Relax, and lower the knee to the starting position. 5. Repeat with the other leg. Repeat 2 to 4 times with each leg. 6. To get more stretch, put your other leg flat on the floor while pulling your knee to your chest.  Curl-ups    1.  Lie on the floor on your back with your knees bent at a 90-degree angle. Your feet should be flat on the floor, about 12 inches from your buttocks. 2. Cross your arms over your chest. If this bothers your neck, try putting your hands behind your neck (not your head), with your elbows spread apart. 3. Slowly tighten your belly muscles and raise your shoulder blades off the floor. 4. Keep your head in line with your body, and do not press your chin to your chest.  5. Hold this position for 1 or 2 seconds, then slowly lower yourself back down to the floor. 6. Repeat 8 to 12 times. Pelvic tilt exercise    1. Lie on your back with your knees bent. 2. \"Brace\" your stomach. This means to tighten your muscles by pulling in and imagining your belly button moving toward your spine. You should feel like your back is pressing to the floor and your hips and pelvis are rocking back. 3. Hold for about 6 seconds while you breathe smoothly. 4. Repeat 8 to 12 times. Heel dig bridging    1. Lie on your back with both knees bent and your ankles bent so that only your heels are digging into the floor. Your knees should be bent about 90 degrees. 2. Then push your heels into the floor, squeeze your buttocks, and lift your hips off the floor until your shoulders, hips, and knees are all in a straight line. 3. Hold for about 6 seconds as you continue to breathe normally, and then slowly lower your hips back down to the floor and rest for up to 10 seconds. 4. Do 8 to 12 repetitions. Hamstring stretch in doorway    1. Lie on your back in a doorway, with one leg through the open door. 2. Slide your leg up the wall to straighten your knee. You should feel a gentle stretch down the back of your leg. 3. Hold the stretch for at least 15 to 30 seconds. Do not arch your back, point your toes, or bend either knee. Keep one heel touching the floor and the other heel touching the wall. 4. Repeat with your other leg. 5. Do 2 to 4 times for each leg.   Hip flexor stretch    1. Kneel on the floor with one knee bent and one leg behind you. Place your forward knee over your foot. Keep your other knee touching the floor. 2. Slowly push your hips forward until you feel a stretch in the upper thigh of your rear leg. 3. Hold the stretch for at least 15 to 30 seconds. Repeat with your other leg. 4. Do 2 to 4 times on each side. Wall sit    1. Stand with your back 10 to 12 inches away from a wall. 2. Lean into the wall until your back is flat against it. 3. Slowly slide down until your knees are slightly bent, pressing your lower back into the wall. 4. Hold for about 6 seconds, then slide back up the wall. 5. Repeat 8 to 12 times. Follow-up care is a key part of your treatment and safety. Be sure to make and go to all appointments, and call your doctor if you are having problems. It's also a good idea to know your test results and keep a list of the medicines you take. Where can you learn more? Go to http://alexa-kirby.info/. Enter G372 in the search box to learn more about \"Low Back Pain: Exercises. \"  Current as of: March 21, 2017  Content Version: 11.3  © 1518-8470 [employer]. Care instructions adapted under license by [employer] (which disclaims liability or warranty for this information). If you have questions about a medical condition or this instruction, always ask your healthcare professional. [place] [address] any warranty or liability for your use of this information. Stopping Smoking: Care Instructions  Your Care Instructions  Cigarette smokers crave the nicotine in cigarettes. Giving it up is much harder than simply changing a habit. Your body has to stop craving the nicotine. It is hard to quit, but you can do it. There are many tools that people use to quit smoking. You may find that combining tools works best for you. There are several steps to quitting. First you get ready to quit. Then you get support to help you. After that, you learn new skills and behaviors to become a nonsmoker. For many people, a necessary step is getting and using medicine. Your doctor will help you set up the plan that best meets your needs. You may want to attend a smoking cessation program to help you quit smoking. When you choose a program, look for one that has proven success. Ask your doctor for ideas. You will greatly increase your chances of success if you take medicine as well as get counseling or join a cessation program.  Some of the changes you feel when you first quit tobacco are uncomfortable. Your body will miss the nicotine at first, and you may feel short-tempered and grumpy. You may have trouble sleeping or concentrating. Medicine can help you deal with these symptoms. You may struggle with changing your smoking habits and rituals. The last step is the tricky one: Be prepared for the smoking urge to continue for a time. This is a lot to deal with, but keep at it. You will feel better. Follow-up care is a key part of your treatment and safety. Be sure to make and go to all appointments, and call your doctor if you are having problems. Its also a good idea to know your test results and keep a list of the medicines you take. How can you care for yourself at home? · Ask your family, friends, and coworkers for support. You have a better chance of quitting if you have help and support. · Join a support group, such as Nicotine Anonymous, for people who are trying to quit smoking. · Consider signing up for a smoking cessation program, such as the American Lung Association's Freedom from Smoking program.  · Set a quit date. Pick your date carefully so that it is not right in the middle of a big deadline or stressful time. Once you quit, do not even take a puff. Get rid of all ashtrays and lighters after your last cigarette. Clean your house and your clothes so that they do not smell of smoke.   · Learn how to be a nonsmoker. Think about ways you can avoid those things that make you reach for a cigarette. ¨ Avoid situations that put you at greatest risk for smoking. For some people, it is hard to have a drink with friends without smoking. For others, they might skip a coffee break with coworkers who smoke. ¨ Change your daily routine. Take a different route to work or eat a meal in a different place. · Cut down on stress. Calm yourself or release tension by doing an activity you enjoy, such as reading a book, taking a hot bath, or gardening. · Talk to your doctor or pharmacist about nicotine replacement therapy, which replaces the nicotine in your body. You still get nicotine but you do not use tobacco. Nicotine replacement products help you slowly reduce the amount of nicotine you need. These products come in several forms, many of them available over-the-counter:  ¨ Nicotine patches  ¨ Nicotine gum and lozenges  ¨ Nicotine inhaler  · Ask your doctor about bupropion (Wellbutrin) or varenicline (Chantix), which are prescription medicines. They do not contain nicotine. They help you by reducing withdrawal symptoms, such as stress and anxiety. · Some people find hypnosis, acupuncture, and massage helpful for ending the smoking habit. · Eat a healthy diet and get regular exercise. Having healthy habits will help your body move past its craving for nicotine. · Be prepared to keep trying. Most people are not successful the first few times they try to quit. Do not get mad at yourself if you smoke again. Make a list of things you learned and think about when you want to try again, such as next week, next month, or next year. Where can you learn more? Go to http://alexa-kirby.info/. Enter G353 in the search box to learn more about \"Stopping Smoking: Care Instructions. \"  Current as of: March 20, 2017  Content Version: 11.3  © 0238-5730 [employer].  Care instructions adapted under license by [address] (which disclaims liability or warranty for this information). If you have questions about a medical condition or this instruction, always ask your healthcare professional. [address] any warranty or liability for your use of this information.

## 2017-07-20 ENCOUNTER — TELEPHONE (OUTPATIENT)
Dept: FAMILY MEDICINE CLINIC | Age: 47
End: 2017-07-20

## 2017-07-20 NOTE — TELEPHONE ENCOUNTER
Is ortho going to complete her paperwork for missing work or am I? If I am, please confirm dates missed.

## 2017-07-20 NOTE — TELEPHONE ENCOUNTER
Pt just left her specialist's office, dx with DDD, has to do PT and pt was prescribed flexeril. Pt informed specialist she wanted to go back to work on 07/24/17. CB# [phone].

## 2017-07-21 ENCOUNTER — DOCUMENTATION ONLY (OUTPATIENT)
Dept: FAMILY MEDICINE CLINIC | Age: 47
End: 2017-07-21

## 2017-07-21 NOTE — TELEPHONE ENCOUNTER
Pt states she needs you to fill out the paperwork for dates 06/26/17 - 07/24/17. Pt is going to drop off the paperwork from Dr. Mohan Grey today and is asking if you can wait to do the paperwork until she drops it off.

## 2017-07-21 NOTE — PROGRESS NOTES
Patient dropped off two papers to go with her fmla forms. Placed in [employer] bin up front. Call back number after they have been sent off is [phone]. Thanks.

## 2017-07-24 ENCOUNTER — TELEPHONE (OUTPATIENT)
Dept: FAMILY MEDICINE CLINIC | Age: 47
End: 2017-07-24

## 2017-07-24 NOTE — TELEPHONE ENCOUNTER
Paperwork complete. Please remind pt that we have 72 business hours to complete paperwork for future reference.

## 2017-07-24 NOTE — TELEPHONE ENCOUNTER
Forms have been faxed with confirmation received; confirmation # [id number]. Pt came into office to  copy of forms.

## 2017-07-24 NOTE — TELEPHONE ENCOUNTER
Pt calling and states that she dropped off paperwork on Friday for FMLA for our office plus a letter from her ortho Dr Giovanna Villar that she needs [place] to sign and wants to know if this is done because she needs to get this done today. Please call her back at [phone].

## 2017-08-15 ENCOUNTER — OFFICE VISIT (OUTPATIENT)
Dept: INTERNAL MEDICINE CLINIC | Age: 47
End: 2017-08-15

## 2017-08-15 VITALS
RESPIRATION RATE: 18 BRPM | HEIGHT: 64 IN | HEART RATE: 86 BPM | OXYGEN SATURATION: 98 % | SYSTOLIC BLOOD PRESSURE: 124 MMHG | WEIGHT: 187 LBS | TEMPERATURE: 98.6 F | BODY MASS INDEX: 31.92 KG/M2 | DIASTOLIC BLOOD PRESSURE: 82 MMHG

## 2017-08-15 DIAGNOSIS — M54.50 CHRONIC MIDLINE LOW BACK PAIN WITHOUT SCIATICA: ICD-10-CM

## 2017-08-15 DIAGNOSIS — M54.2 NECK PAIN: ICD-10-CM

## 2017-08-15 DIAGNOSIS — R73.03 PREDIABETES: ICD-10-CM

## 2017-08-15 DIAGNOSIS — E87.6 HYPOKALEMIA: ICD-10-CM

## 2017-08-15 DIAGNOSIS — G89.29 CHRONIC MIDLINE LOW BACK PAIN WITHOUT SCIATICA: ICD-10-CM

## 2017-08-15 DIAGNOSIS — I10 ESSENTIAL HYPERTENSION: Primary | ICD-10-CM

## 2017-08-15 RX ORDER — METHOCARBAMOL 750 MG/1
750 TABLET, FILM COATED ORAL
COMMUNITY
Start: 2017-06-28 | End: 2017-08-15 | Stop reason: ALTCHOICE

## 2017-08-15 RX ORDER — CYCLOBENZAPRINE HCL 10 MG
10 TABLET ORAL
Refills: 0 | COMMUNITY
Start: 2017-08-06 | End: 2017-12-19 | Stop reason: SDUPTHER

## 2017-08-15 RX ORDER — QUETIAPINE FUMARATE 50 MG/1
50 TABLET, FILM COATED ORAL
COMMUNITY
End: 2017-08-15 | Stop reason: SDUPTHER

## 2017-08-15 RX ORDER — QUETIAPINE FUMARATE 25 MG/1
TABLET, FILM COATED ORAL
Refills: 2 | COMMUNITY
Start: 2017-07-14 | End: 2017-08-15 | Stop reason: DRUGHIGH

## 2017-08-15 RX ORDER — ALPRAZOLAM 1 MG/1
1 TABLET ORAL
COMMUNITY
End: 2017-08-15 | Stop reason: SDUPTHER

## 2017-08-15 RX ORDER — VALSARTAN AND HYDROCHLOROTHIAZIDE 320; 25 MG/1; MG/1
1 TABLET, FILM COATED ORAL
COMMUNITY
Start: 2017-04-03 | End: 2017-08-15 | Stop reason: ALTCHOICE

## 2017-08-15 RX ORDER — AMITRIPTYLINE HYDROCHLORIDE 100 MG/1
200 TABLET, FILM COATED ORAL
COMMUNITY
End: 2017-08-15 | Stop reason: SDUPTHER

## 2017-08-15 RX ORDER — POTASSIUM CHLORIDE 20 MEQ/1
20 TABLET, EXTENDED RELEASE ORAL
COMMUNITY
Start: 2017-04-06 | End: 2017-08-15 | Stop reason: SDUPTHER

## 2017-08-15 RX ORDER — CLONAZEPAM 1 MG/1
1 TABLET ORAL
COMMUNITY
End: 2017-08-15 | Stop reason: SDUPTHER

## 2017-08-15 NOTE — MR AVS SNAPSHOT
Visit Information Date & Time Provider Department Dept. Phone Encounter #  
 8/15/2017  1:30 PM Chase Garcia MD [facility] [address] [id number] Follow-up Instructions Return in about 3 months (around 11/15/2017). Upcoming Health Maintenance Date Due Pneumococcal 19-64 Medium Risk (1 of 1 - PPSV23) 9/10/1989 PAP AKA CERVICAL CYTOLOGY 9/10/1991 INFLUENZA AGE 9 TO ADULT 8/1/2017 DTaP/Tdap/Td series (2 - Td) 6/11/2027 Allergies as of 8/15/2017  Review Complete On: 8/15/2017 By: Chase Garcia MD  
 No Known Allergies Current Immunizations  Never Reviewed Name Date Hep A Vaccine 6/11/2017 Hep B Vaccine 6/11/2017 Tdap 6/11/2017 Not reviewed this visit You Were Diagnosed With   
  
 Codes Comments Essential hypertension    -  Primary ICD-10-CM: I10 
ICD-9-CM: 401.9 Prediabetes     ICD-10-CM: R73.03 
ICD-9-CM: 790.29 Hypokalemia     ICD-10-CM: E87.6 ICD-9-CM: 276.8 Neck pain     ICD-10-CM: M54.2 ICD-9-CM: 723.1 Chronic midline low back pain without sciatica     ICD-10-CM: M54.5, G89.29 ICD-9-CM: 724.2, 338.29 Vitals BP Pulse Temp Resp Height(growth percentile) Weight(growth percentile) 124/82 (BP 1 Location: Left arm, BP Patient Position: Sitting) 86 98.6 °F (37 °C) (Oral) 18 5' 4\" (1.626 m) 187 lb (84.8 kg) LMP SpO2 BMI OB Status Smoking Status 08/08/2017 98% 32.1 kg/m2 Having regular periods Current Every Day Smoker Vitals History BMI and BSA Data Body Mass Index Body Surface Area  
 32.1 kg/m 2 1.96 m 2 Preferred Pharmacy Pharmacy Name Phone [facility] [address] AT [facility]  [facility] [phone] Your Updated Medication List  
  
   
This list is accurate as of: 8/15/17  2:05 PM.  Always use your most recent med list.  
  
  
  
  
 amitriptyline 100 mg tablet Commonly known as:  ELAVIL Take 200 mg by mouth nightly. clonazePAM 1 mg tablet Commonly known as:  Alonza Parks Take 1 mg by mouth two (2) times a day. cyclobenzaprine 10 mg tablet Commonly known as:  FLEXERIL Take 10 mg by mouth three (3) times daily as needed. pantoprazole 20 mg tablet Commonly known as:  PROTONIX Take 1 Tab by mouth daily. potassium chloride 20 mEq tablet Commonly known as:  K-DUR, KLOR-CON Take 1 Tab by mouth two (2) times a day. QUEtiapine 50 mg tablet Commonly known as:  SEROquel Take 50 mg by mouth nightly. valsartan-hydroCHLOROthiazide 320-25 mg per tablet Commonly known as:  DIOVAN-HCT Take 1 Tab by mouth daily. XANAX 1 mg tablet Generic drug:  ALPRAZolam  
Take 1 mg by mouth three (3) times daily as needed for Anxiety. We Performed the Following METABOLIC PANEL, BASIC [36849 CPT(R)] Follow-up Instructions Return in about 3 months (around 11/15/2017). Introducing [facility]! Lydia Barone introduces Augure patient portal. Now you can access parts of your medical record, email your doctor's office, and request medication refills online. 1. In your internet browser, go to [email or web address] 2. Click on the First Time User? Click Here link in the Sign In box. You will see the New Member Sign Up page. 3. Enter your Augure Access Code exactly as it appears below. You will not need to use this code after youve completed the sign-up process. If you do not sign up before the expiration date, you must request a new code. · Augure Access Code: [email or web address] Expires: 9/18/2017 11:32 AM 
 
4. Enter the last four digits of your Social Security Number (xxxx) and Date of Birth (mm/dd/yyyy) as indicated and click Submit. You will be taken to the next sign-up page. 5. Create a Augure ID.  This will be your Augure login ID and cannot be changed, so think of one that is secure and easy to remember. 6. Create a [employer] password. You can change your password at any time. 7. Enter your Password Reset Question and Answer. This can be used at a later time if you forget your password. 8. Enter your e-mail address. You will receive e-mail notification when new information is available in [address] 9. Click Sign Up. You can now view and download portions of your medical record. 10. Click the Download Summary menu link to download a portable copy of your medical information. If you have questions, please visit the Frequently Asked Questions section of the [employer] website. Remember, [employer] is NOT to be used for urgent needs. For medical emergencies, dial 911. Now available from your iPhone and Android! Please provide this summary of care documentation to your next provider. Your primary care clinician is listed as Rafael Alas. If you have any questions after today's visit, please call [phone].

## 2017-08-15 NOTE — PROGRESS NOTES
HISTORY OF PRESENT ILLNESS  Daryl Arreaga is a 55 y.o. female. HPI  new to my practice  Transferring care from Dr. Nadya Llamas. Last evaluated there 1  month ago. Previous medical records are available for my review at this visit. Hypertension:  Daryl Arreaga is a 55 y.o. female with hypertension. with Chronic kidney disease stage 2   Medication change since last visit: Yes: Comment: none  The patient reports:  taking medications as instructed, no medication side effects noted, patient does not perform home BP monitoring, no chest pain on exertion, no dyspnea on exertion, no swelling of ankles, no orthostatic dizziness or lightheadedness, no palpitations. Lifestyle modification/social history: generally follows a low fat low cholesterol diet, generally follows a low sodium diet, smoker . Lab Results   Component Value Date/Time    Sodium 140 03/20/2017 10:24 AM    Potassium 3.5 03/20/2017 10:24 AM    Chloride 99 03/20/2017 10:24 AM    CO2 25 03/20/2017 10:24 AM    Glucose 101 03/20/2017 10:24 AM    BUN 14 03/20/2017 10:24 AM    Creatinine 0.99 03/20/2017 10:24 AM    BUN/Creatinine ratio 14 03/20/2017 10:24 AM    GFR est AA 79 03/20/2017 10:24 AM    GFR est non-AA 69 03/20/2017 10:24 AM    Calcium 9.0 03/20/2017 10:24 AM     .   Prediabetes:  Daryl Arreaga is here for follow up of elevated fasting sugars and prediabetes. she has been counseled before on low carb/ low concentrated sweets diet and is following that plan. further diabetic ROS: no polyuria or polydipsia, no chest pain, dyspnea or TIAs, no numbness, tingling or pain in extremities . Lab Results   Component Value Date/Time    Glucose 101 03/20/2017 10:24 AM     Lab Results   Component Value Date/Time    Hemoglobin A1c 5.7 03/20/2017 10:24 AM     Last Point of Care HGB A1C  No results found for: [id number]     Problem follow up:  Daryl Arreaga returns for follow up visit regarding neck and lower back muscle spasms.   she was seen 1 months ago in PCP and then orthopedics office diagnosed with same and treated with flexeril a, PT. Workup was significant for   EXAM:  XR SPINE CERV 4 OR 5 V     INDICATION:  Chronic neck pain radiating between shoulder blades. No antecedent  trauma.     COMPARISON: None.     FINDINGS: AP, lateral, bilateral oblique and open mouth odontoid views  of the  cervical spine were obtained. Vertebral body height and disc height are normal.   There is no listhesis though there is mild reversal of cervical lordosis  centered at C5. The posterior processes and facet joints appear intact. No  osseous foraminal narrowing is shown. The odontoid process is intact and the  C1-C2 relationship is normal. No prevertebral soft tissue swelling is  demonstrated.      IMPRESSION  IMPRESSION: With exception of mild reversal of cervical lordosis centered at C5,  normal radiographs. .  Notes, labs, studies, imaging related to this problem during prior visit were available . Since that visit, she has improved. she has been compliant with prescribed treatment. Residual symptoms include: milder midline neck to lumbar spine tenderness. No radiation to extremities. No peripheral neuromotor/sensory symptoms. New issues associated with this problem include: none. .          Patient Active Problem List   Diagnosis Code    Mixed hyperlipidemia E78.2    Prediabetes R73.03    Essential hypertension I10     History reviewed. No pertinent surgical history. Social History     Social History    Marital status: SINGLE     Spouse name: N/A    Number of children: N/A    Years of education: N/A     Occupational History    Not on file.      Social History Main Topics    Smoking status: Current Every Day Smoker     Packs/day: 1.00     Types: Cigarettes    Smokeless tobacco: Never Used    Alcohol use 2.4 oz/week     4 Cans of beer per week    Drug use: No    Sexual activity: Yes     Partners: Male     Other Topics Concern    Not on file     Social History Narrative     Family History   Problem Relation Age of Onset   [facility] Cancer Mother      Lung    Hypertension Mother     Cancer Father      stomach    Hypertension Sister     Diabetes Maternal Grandmother     Diabetes Paternal Grandmother      Current Outpatient Prescriptions   Medication Sig    cyclobenzaprine (FLEXERIL) 10 mg tablet Take 10 mg by mouth three (3) times daily as needed.  potassium chloride (K-DUR, KLOR-CON) 20 mEq tablet Take 1 Tab by mouth two (2) times a day.  valsartan-hydroCHLOROthiazide (DIOVAN-HCT) 320-25 mg per tablet Take 1 Tab by mouth daily.  pantoprazole (PROTONIX) 20 mg tablet Take 1 Tab by mouth daily.  amitriptyline (ELAVIL) 100 mg tablet Take 200 mg by mouth nightly.  clonazePAM (KLONOPIN) 1 mg tablet Take 1 mg by mouth two (2) times a day.  ALPRAZolam (XANAX) 1 mg tablet Take 1 mg by mouth three (3) times daily as needed for Anxiety.  QUEtiapine (SEROQUEL) 50 mg tablet Take 50 mg by mouth nightly. No current facility-administered medications for this visit. No Known Allergies  Immunization History   Administered Date(s) Administered    Hep A Vaccine 06/11/2017    Hep B Vaccine 06/11/2017    Tdap 06/11/2017           Review of Systems   Constitutional: Negative for malaise/fatigue and weight loss. HENT: Negative for congestion and sore throat. Eyes: Negative for blurred vision. Respiratory: Negative for cough, shortness of breath and wheezing. Cardiovascular: Negative for chest pain, palpitations and leg swelling. Gastrointestinal: Negative for abdominal pain, blood in stool, constipation, diarrhea, heartburn, nausea and vomiting. Genitourinary: Negative for dysuria and hematuria. Musculoskeletal: Positive for back pain and neck pain. Negative for joint pain. Skin: Negative for rash. Neurological: Negative for dizziness and headaches. Endo/Heme/Allergies: Negative for environmental allergies.  Does not bruise/bleed easily. Psychiatric/Behavioral: Negative for substance abuse. Physical Exam   Constitutional: She is oriented to person, place, and time. She appears well-developed and well-nourished. No distress. /82 (BP 1 Location: Left arm, BP Patient Position: Sitting)  Pulse 86  Temp 98.6 °F (37 °C) (Oral)   Resp 18  Ht 5' 4\" (1.626 m)  Wt 187 lb (84.8 kg)  LMP 08/08/2017  SpO2 98%  BMI 32.1 kg/m2Body mass index is 32.1 kg/(m^2). HENT:   Head: Normocephalic. Right Ear: Hearing normal. No decreased hearing is noted. Left Ear: Hearing normal. No decreased hearing is noted. Nose: Nose normal.   Mouth/Throat: Oropharynx is clear and moist and mucous membranes are normal. Normal dentition. No oropharyngeal exudate. Eyes: Conjunctivae and lids are normal. Pupils are equal, round, and reactive to light. No scleral icterus. Neck: Trachea normal and normal range of motion. Neck supple. No thyromegaly present. Cardiovascular: Normal rate, regular rhythm, normal heart sounds and intact distal pulses. No murmur heard. Pulmonary/Chest: Effort normal and breath sounds normal.   Abdominal: Soft. Normal appearance and bowel sounds are normal. She exhibits no distension and no mass. There is no hepatosplenomegaly. There is no tenderness. Musculoskeletal: Normal range of motion. She exhibits no edema. Cervical back: She exhibits tenderness, bony tenderness and pain. She exhibits normal range of motion, no swelling, no edema, no deformity and no spasm. Back:    Diffuse spine tenderness to palpation   Lymphadenopathy:     She has no cervical adenopathy. Neurological: She is alert and oriented to person, place, and time. Skin: Skin is warm and dry. No rash noted. She is not diaphoretic. Psychiatric: She has a normal mood and affect.  Her speech is normal and behavior is normal. Judgment and thought content normal. Cognition and memory are normal.   Nursing note and vitals reviewed. ASSESSMENT and PLAN  Diagnoses and all orders for this visit:    1. Essential hypertension - Well controlled and stable. her medications were reviewed and refilled where necessary as noted below. Labs ordered as noted. -     METABOLIC PANEL, BASIC    2. Prediabetes - Well controlled and stable. her medications were reviewed and refilled where necessary as noted below. Labs ordered as noted. 3. Hypokalemia -recheck on supplement  -     METABOLIC PANEL, BASIC    4. Neck pain - improved with PT, flexeril prn. Consider basic yoga 2-3 / week    5. Chronic midline low back pain without sciatica - as above      Follow-up Disposition:  Return in about 3 months (around 11/15/2017).

## 2017-08-16 LAB
BUN SERPL-MCNC: 11 MG/DL (ref 6–24)
BUN/CREAT SERPL: 12 (ref 9–23)
CALCIUM SERPL-MCNC: 9.2 MG/DL (ref 8.7–10.2)
CHLORIDE SERPL-SCNC: 97 MMOL/L (ref 96–106)
CO2 SERPL-SCNC: 27 MMOL/L (ref 18–29)
CREAT SERPL-MCNC: 0.95 MG/DL (ref 0.57–1)
GLUCOSE SERPL-MCNC: 95 MG/DL (ref 65–99)
POTASSIUM SERPL-SCNC: 4.3 MMOL/L (ref 3.5–5.2)
SODIUM SERPL-SCNC: 138 MMOL/L (ref 134–144)

## 2017-10-02 ENCOUNTER — TELEPHONE (OUTPATIENT)
Dept: INTERNAL MEDICINE CLINIC | Age: 47
End: 2017-10-02

## 2017-10-02 RX ORDER — VALSARTAN AND HYDROCHLOROTHIAZIDE 320; 25 MG/1; MG/1
1 TABLET, FILM COATED ORAL DAILY
Qty: 30 TAB | Refills: 2 | Status: SHIPPED | OUTPATIENT
Start: 2017-10-02 | End: 2018-02-21 | Stop reason: SDUPTHER

## 2017-11-13 DIAGNOSIS — E87.6 HYPOKALEMIA: ICD-10-CM

## 2017-11-14 RX ORDER — POTASSIUM CHLORIDE 20 MEQ/1
20 TABLET, EXTENDED RELEASE ORAL 2 TIMES DAILY
Qty: 60 TAB | Refills: 2 | Status: SHIPPED | OUTPATIENT
Start: 2017-11-14 | End: 2018-11-29 | Stop reason: ALTCHOICE

## 2017-12-19 ENCOUNTER — TELEPHONE (OUTPATIENT)
Dept: INTERNAL MEDICINE CLINIC | Age: 47
End: 2017-12-19

## 2017-12-19 ENCOUNTER — HOSPITAL ENCOUNTER (OUTPATIENT)
Dept: LAB | Age: 47
Discharge: HOME | End: 2017-12-19
Payer: COMMERCIAL

## 2017-12-19 ENCOUNTER — OFFICE VISIT (OUTPATIENT)
Dept: INTERNAL MEDICINE CLINIC | Age: 47
End: 2017-12-19

## 2017-12-19 VITALS
BODY MASS INDEX: 31.69 KG/M2 | WEIGHT: 185.6 LBS | HEIGHT: 64 IN | DIASTOLIC BLOOD PRESSURE: 94 MMHG | OXYGEN SATURATION: 98 % | SYSTOLIC BLOOD PRESSURE: 138 MMHG | TEMPERATURE: 98.1 F | HEART RATE: 90 BPM | RESPIRATION RATE: 12 BRPM

## 2017-12-19 DIAGNOSIS — R01.1 HEART MURMUR: ICD-10-CM

## 2017-12-19 DIAGNOSIS — R73.03 PREDIABETES: ICD-10-CM

## 2017-12-19 DIAGNOSIS — R05.3 CHRONIC COUGH: ICD-10-CM

## 2017-12-19 DIAGNOSIS — N92.0 MENORRHAGIA WITH REGULAR CYCLE: ICD-10-CM

## 2017-12-19 DIAGNOSIS — M50.30 DDD (DEGENERATIVE DISC DISEASE), CERVICAL: ICD-10-CM

## 2017-12-19 DIAGNOSIS — E78.2 MIXED HYPERLIPIDEMIA: ICD-10-CM

## 2017-12-19 DIAGNOSIS — N89.8 VAGINAL ODOR: ICD-10-CM

## 2017-12-19 DIAGNOSIS — I10 ESSENTIAL HYPERTENSION: ICD-10-CM

## 2017-12-19 DIAGNOSIS — M51.36 DDD (DEGENERATIVE DISC DISEASE), LUMBAR: ICD-10-CM

## 2017-12-19 DIAGNOSIS — Z01.419 WELL WOMAN EXAM WITH ROUTINE GYNECOLOGICAL EXAM: Primary | ICD-10-CM

## 2017-12-19 PROCEDURE — 88175 CYTOPATH C/V AUTO FLUID REDO: CPT | Performed by: NURSE PRACTITIONER

## 2017-12-19 PROCEDURE — 87624 HPV HI-RISK TYP POOLED RSLT: CPT | Performed by: NURSE PRACTITIONER

## 2017-12-19 RX ORDER — CYCLOBENZAPRINE HCL 10 MG
10 TABLET ORAL
Qty: 60 TAB | Refills: 2 | Status: SHIPPED | OUTPATIENT
Start: 2017-12-19 | End: 2018-01-22

## 2017-12-19 RX ORDER — DICLOFENAC SODIUM 75 MG/1
TABLET, DELAYED RELEASE ORAL
Refills: 0 | COMMUNITY
Start: 2017-11-19 | End: 2017-12-19 | Stop reason: SDUPTHER

## 2017-12-19 RX ORDER — DICLOFENAC SODIUM 75 MG/1
TABLET, DELAYED RELEASE ORAL
Qty: 60 TAB | Refills: 3 | Status: SHIPPED | OUTPATIENT
Start: 2017-12-19 | End: 2018-02-14 | Stop reason: ALTCHOICE

## 2017-12-19 NOTE — PATIENT INSTRUCTIONS
Well Visit, Women 48 to 72: Care Instructions  Your Care Instructions    Physical exams can help you stay healthy. Your doctor has checked your overall health and may have suggested ways to take good care of yourself. He or she also may have recommended tests. At home, you can help prevent illness with healthy eating, regular exercise, and other steps. Follow-up care is a key part of your treatment and safety. Be sure to make and go to all appointments, and call your doctor if you are having problems. It's also a good idea to know your test results and keep a list of the medicines you take. How can you care for yourself at home? · Reach and stay at a healthy weight. This will lower your risk for many problems, such as obesity, diabetes, heart disease, and high blood pressure. · Get at least 30 minutes of exercise on most days of the week. Walking is a good choice. You also may want to do other activities, such as running, swimming, cycling, or playing tennis or team sports. · Do not smoke. Smoking can make health problems worse. If you need help quitting, talk to your doctor about stop-smoking programs and medicines. These can increase your chances of quitting for good. · Protect your skin from too much sun. When you're outdoors from 10 a.m. to 4 p.m., stay in the shade or cover up with clothing and a hat with a wide brim. Wear sunglasses that block UV rays. Even when it's cloudy, put broad-spectrum sunscreen (SPF 30 or higher) on any exposed skin. · See a dentist one or two times a year for checkups and to have your teeth cleaned. · Wear a seat belt in the car. · Limit alcohol to 1 drink a day. Too much alcohol can cause health problems. Follow your doctor's advice about when to have certain tests. These tests can spot problems early. · Cholesterol.  Your doctor will tell you how often to have this done based on your age, family history, or other things that can increase your risk for heart attack and stroke. · Blood pressure. Have your blood pressure checked during a routine doctor visit. Your doctor will tell you how often to check your blood pressure based on your age, your blood pressure results, and other factors. · Mammogram. Ask your doctor how often you should have a mammogram, which is an X-ray of your breasts. A mammogram can spot breast cancer before it can be felt and when it is easiest to treat. · Pap test and pelvic exam. Ask your doctor how often you should have a Pap test. You may not need to have a Pap test as often as you used to. · Vision. Have your eyes checked every year or two or as often as your doctor suggests. Some experts recommend that you have yearly exams for glaucoma and other age-related eye problems starting at age 48. · Hearing. Tell your doctor if you notice any change in your hearing. You can have tests to find out how well you hear. · Diabetes. Ask your doctor whether you should have tests for diabetes. · Colon cancer. You should begin tests for colon cancer at age 48. You may have one of several tests. Your doctor will tell you how often to have tests based on your age and risk. Risks include whether you already had a precancerous polyp removed from your colon or whether your parents, sisters and brothers, or children have had colon cancer. · Thyroid disease. Talk to your doctor about whether to have your thyroid checked as part of a regular physical exam. Women have an increased chance of a thyroid problem. · Osteoporosis. You should begin tests for bone density at age 72. If you are younger than 72, ask your doctor whether you have factors that may increase your risk for this disease. You may want to have this test before age 72. · Heart attack and stroke risk. At least every 4 to 6 years, you should have your risk for heart attack and stroke assessed.  Your doctor uses factors such as your age, blood pressure, cholesterol, and whether you smoke or have diabetes to show what your risk for a heart attack or stroke is over the next 10 years. When should you call for help? Watch closely for changes in your health, and be sure to contact your doctor if you have any problems or symptoms that concern you. Where can you learn more? Go to http://alexa-kirby.info/. Enter K224 in the search box to learn more about \"Well Visit, Women 50 to 72: Care Instructions. \"  Current as of: May 12, 2017  Content Version: 11.4  © 7127-7547 [employer]. Care instructions adapted under license by [employer] (which disclaims liability or warranty for this information). If you have questions about a medical condition or this instruction, always ask your healthcare professional. [address] any warranty or liability for your use of this information. Chronic Cough: Care Instructions  Your Care Instructions    A cough is your body's response to something that bothers your throat or airways. Many things can cause a cough. You might cough because of a cold or the flu, bronchitis, or asthma. Smoking, postnasal drip, allergies, and stomach acid that backs up into your throat also can cause a cough. A cough can be short-term (acute) or long-term (chronic). A chronic cough lasts more than 3 weeks. A chronic cough is often caused by a long-term problem, such as asthma. Another cause might be a medicine, such as an ACE inhibitor. A cough is a symptom, not a disease. To treat a chronic cough, you may need to treat the problem that causes it. You can take a few steps at home to cough less and feel better. Some people cough or clear their throat out of habit for no clear reason. Follow-up care is a key part of your treatment and safety. Be sure to make and go to all appointments, and call your doctor if you are having problems. It's also a good idea to know your test results and keep a list of the medicines you take.   How can you care for yourself at home? · Drink plenty of water and other fluids. This may help soothe a dry or sore throat. Honey or lemon juice in hot water or tea may ease a dry cough. · Prop up your head on pillows to help you breathe and ease a cough. · Do not smoke or allow others to smoke around you. Smoke can make a cough worse. If you need help quitting, talk to your doctor about stop-smoking programs and medicines. These can increase your chances of quitting for good. · Avoid exposure to smoke, dust, or other pollutants, or wear a face mask. Check with your doctor or pharmacist to find out which type of face mask will give you the most benefit. · Take cough medicine as directed by your doctor. · Try cough drops to soothe a dry or sore throat. Cough drops don't stop a cough. Medicine-flavored cough drops are no better than candy-flavored drops or hard candy. Throat clearing  When you have a chronic cough or a disease that may cause this type of cough, you may often feel like you want to clear your throat. This helps bring up mucus. But throat clearing does not always have a cause. Throat clearing can become a habit. The more you do it, the more you feel like you need to do it. But frequent throat clearing can be hard on your vocal cords. It's like slamming them together. To help lessen throat clearing, you can try:  · Taking small sips of water. · Not clearing your throat when you feel you need to. · Swallowing hard when you want to clear your throat. You may want to ask your doctor if a medicine that thins mucus would help. When should you call for help? Call 911 anytime you think you may need emergency care. For example, call if:  ? · You have severe trouble breathing. ?Call your doctor now or seek immediate medical care if:  ? · You cough up blood. ? · You have new or worse trouble breathing. ? · You have a new or higher fever. ? Watch closely for changes in your health, and be sure to contact your doctor if:  ? · You cough more deeply or more often, especially if you notice more mucus or a change in the color of your mucus. ? · You do not get better as expected. Where can you learn more? Go to http://alexa-kirby.info/. Enter G879 in the search box to learn more about \"Chronic Cough: Care Instructions. \"  Current as of: May 12, 2017  Content Version: 11.4  © 2101-6140 [employer]. Care instructions adapted under license by [employer] (which disclaims liability or warranty for this information). If you have questions about a medical condition or this instruction, always ask your healthcare professional. [address] any warranty or liability for your use of this information.

## 2017-12-19 NOTE — TELEPHONE ENCOUNTER
I called pt no answer, LM on VM that her Echocardiogram is scheduled on 12/27/17 at 11am with 1030am arrival [facility] 6th floor [address]. I left their contact number on her VM to call them for questions or if she needed to reschedule the appt.

## 2017-12-19 NOTE — MR AVS SNAPSHOT
Visit Information Date & Time Provider Department Dept. Phone Encounter #  
 12/19/2017  2:00 PM Man Trimble NP [facility] [address] [id number] Upcoming Health Maintenance Date Due Pneumococcal 19-64 Medium Risk (1 of 1 - PPSV23) 9/10/1989 PAP AKA CERVICAL CYTOLOGY 9/10/1991 DTaP/Tdap/Td series (2 - Td) 6/11/2027 Allergies as of 12/19/2017  Review Complete On: 12/19/2017 By: Man Trimble NP No Known Allergies Current Immunizations  Reviewed on 12/19/2017 Name Date Hep A Vaccine 6/11/2017 Hep B Vaccine 6/11/2017 Influenza Vaccine 11/28/2017 Tdap 6/11/2017 Reviewed by Man Trimble NP on 12/19/2017 at  2:46 PM  
You Were Diagnosed With   
  
 Codes Comments Well woman exam with routine gynecological exam    -  Primary ICD-10-CM: J36.877 ICD-9-CM: V72.31   
 DDD (degenerative disc disease), lumbar     ICD-10-CM: M51.36 
ICD-9-CM: 722.52   
 DDD (degenerative disc disease), cervical     ICD-10-CM: M50.30 ICD-9-CM: 722.4 Chronic cough     ICD-10-CM: R05 ICD-9-CM: 786.2 Essential hypertension     ICD-10-CM: I10 
ICD-9-CM: 401.9 Mixed hyperlipidemia     ICD-10-CM: E78.2 ICD-9-CM: 272.2 Prediabetes     ICD-10-CM: R73.03 
ICD-9-CM: 790.29 Menorrhagia with regular cycle     ICD-10-CM: N92.0 ICD-9-CM: 626.2 Heart murmur     ICD-10-CM: R01.1 ICD-9-CM: 117. 2 Vaginal odor     ICD-10-CM: N89.8 ICD-9-CM: 625.8 Vitals BP Pulse Temp Resp Height(growth percentile) Weight(growth percentile) (!) 138/94 (BP 1 Location: Left arm, BP Patient Position: Sitting) 90 98.1 °F (36.7 °C) (Oral) 12 5' 4\" (1.626 m) 185 lb 9.6 oz (84.2 kg) LMP SpO2 BMI OB Status Smoking Status 12/05/2017 (Approximate) 98% 31.86 kg/m2 Having regular periods Current Every Day Smoker BMI and BSA Data Body Mass Index Body Surface Area  
 31.86 kg/m 2 1.95 m 2 Preferred Pharmacy Pharmacy Name Phone [phone], [address] AT [facility]  Dilan reji [phone] Your Updated Medication List  
  
   
This list is accurate as of: 12/19/17  3:06 PM.  Always use your most recent med list.  
  
  
  
  
 amitriptyline 100 mg tablet Commonly known as:  ELAVIL Take 200 mg by mouth nightly. clonazePAM 1 mg tablet Commonly known as:  Derek Brocks Take 1 mg by mouth two (2) times a day. cyclobenzaprine 10 mg tablet Commonly known as:  FLEXERIL Take 1 Tab by mouth three (3) times daily as needed. diclofenac EC 75 mg EC tablet Commonly known as:  VOLTAREN Take 1 tablet twice daily as needed with food  
  
 pantoprazole 20 mg tablet Commonly known as:  PROTONIX Take 1 Tab by mouth daily. potassium chloride 20 mEq tablet Commonly known as:  K-DUR, KLOR-CON Take 1 Tab by mouth two (2) times a day. QUEtiapine 50 mg tablet Commonly known as:  SEROquel Take 50 mg by mouth nightly. valsartan-hydroCHLOROthiazide 320-25 mg per tablet Commonly known as:  DIOVAN-HCT Take 1 Tab by mouth daily. XANAX 1 mg tablet Generic drug:  ALPRAZolam  
Take 1 mg by mouth three (3) times daily as needed for Anxiety. Prescriptions Sent to Pharmacy Refills  
 cyclobenzaprine (FLEXERIL) 10 mg tablet 2 Sig: Take 1 Tab by mouth three (3) times daily as needed. Class: Normal  
 Pharmacy: [facility] [address] Ph #: [phone] Route: Oral  
 diclofenac EC (VOLTAREN) 75 mg EC tablet 3 Sig: Take 1 tablet twice daily as needed with food Class: Normal  
 Pharmacy: [facility] [address] Ph #: [phone] We Performed the Following CBC WITH AUTOMATED DIFF [01708 CPT(R)] FERRITIN [72950 CPT(R)] HEMOGLOBIN A1C WITH EAG [70878 CPT(R)] IRON PROFILE M824826 CPT(R)] LIPID PANEL [06499 CPT(R)] METABOLIC PANEL, COMPREHENSIVE [10895 CPT(R)] NUSWAB VAGINITIS [[id number] Custom] PAP IG, APTIMA HPV AND RFX 16/18,45 ([id number]) [[id number] Custom] TSH 3RD GENERATION [75538 CPT(R)] URINALYSIS W/ RFLX MICROSCOPIC [32251 CPT(R)] VITAMIN D, 25 HYDROXY A8917506 CPT(R)] To-Do List   
 12/19/2017 ECHO:  2D ECHO COMPLETE ADULT (TTE) W OR WO CONTR   
  
 12/19/2017 Imaging:  DOMI MAMMO BI SCREENING INCL CAD   
  
 12/19/2017 Imaging:  XR CHEST PA LAT Patient Instructions Well Visit, Women 48 to 72: Care Instructions Your Care Instructions Physical exams can help you stay healthy. Your doctor has checked your overall health and may have suggested ways to take good care of yourself. He or she also may have recommended tests. At home, you can help prevent illness with healthy eating, regular exercise, and other steps. Follow-up care is a key part of your treatment and safety. Be sure to make and go to all appointments, and call your doctor if you are having problems. It's also a good idea to know your test results and keep a list of the medicines you take. How can you care for yourself at home? · Reach and stay at a healthy weight. This will lower your risk for many problems, such as obesity, diabetes, heart disease, and high blood pressure. · Get at least 30 minutes of exercise on most days of the week. Walking is a good choice. You also may want to do other activities, such as running, swimming, cycling, or playing tennis or team sports. · Do not smoke. Smoking can make health problems worse. If you need help quitting, talk to your doctor about stop-smoking programs and medicines. These can increase your chances of quitting for good. · Protect your skin from too much sun. When you're outdoors from 10 a.m. to 4 p.m., stay in the shade or cover up with clothing and a hat with a wide brim. Wear sunglasses that block UV rays. Even when it's cloudy, put broad-spectrum sunscreen (SPF 30 or higher) on any exposed skin. · See a dentist one or two times a year for checkups and to have your teeth cleaned. · Wear a seat belt in the car. · Limit alcohol to 1 drink a day. Too much alcohol can cause health problems. Follow your doctor's advice about when to have certain tests. These tests can spot problems early. · Cholesterol. Your doctor will tell you how often to have this done based on your age, family history, or other things that can increase your risk for heart attack and stroke. · Blood pressure. Have your blood pressure checked during a routine doctor visit. Your doctor will tell you how often to check your blood pressure based on your age, your blood pressure results, and other factors. · Mammogram. Ask your doctor how often you should have a mammogram, which is an X-ray of your breasts. A mammogram can spot breast cancer before it can be felt and when it is easiest to treat. · Pap test and pelvic exam. Ask your doctor how often you should have a Pap test. You may not need to have a Pap test as often as you used to. · Vision. Have your eyes checked every year or two or as often as your doctor suggests. Some experts recommend that you have yearly exams for glaucoma and other age-related eye problems starting at age 48. · Hearing. Tell your doctor if you notice any change in your hearing. You can have tests to find out how well you hear. · Diabetes. Ask your doctor whether you should have tests for diabetes. · Colon cancer. You should begin tests for colon cancer at age 48. You may have one of several tests. Your doctor will tell you how often to have tests based on your age and risk.  Risks include whether you already had a precancerous polyp removed from your colon or whether your parents, sisters and brothers, or children have had colon cancer. · Thyroid disease. Talk to your doctor about whether to have your thyroid checked as part of a regular physical exam. Women have an increased chance of a thyroid problem. · Osteoporosis. You should begin tests for bone density at age 72. If you are younger than 72, ask your doctor whether you have factors that may increase your risk for this disease. You may want to have this test before age 72. · Heart attack and stroke risk. At least every 4 to 6 years, you should have your risk for heart attack and stroke assessed. Your doctor uses factors such as your age, blood pressure, cholesterol, and whether you smoke or have diabetes to show what your risk for a heart attack or stroke is over the next 10 years. When should you call for help? Watch closely for changes in your health, and be sure to contact your doctor if you have any problems or symptoms that concern you. Where can you learn more? Go to http://alexaForsevakirby.info/. Enter U754 in the search box to learn more about \"Well Visit, Women 50 to 72: Care Instructions. \" Current as of: May 12, 2017 Content Version: 11.4 © 8442-7438 [employer]. Care instructions adapted under license by [employer] (which disclaims liability or warranty for this information). If you have questions about a medical condition or this instruction, always ask your healthcare professional. [place] [address] any warranty or liability for your use of this information. Chronic Cough: Care Instructions Your Care Instructions A cough is your body's response to something that bothers your throat or airways. Many things can cause a cough. You might cough because of a cold or the flu, bronchitis, or asthma. Smoking, postnasal drip, allergies, and stomach acid that backs up into your throat also can cause a cough. A cough can be short-term (acute) or long-term (chronic). A chronic cough lasts more than 3 weeks. A chronic cough is often caused by a long-term problem, such as asthma. Another cause might be a medicine, such as an ACE inhibitor. A cough is a symptom, not a disease. To treat a chronic cough, you may need to treat the problem that causes it. You can take a few steps at home to cough less and feel better. Some people cough or clear their throat out of habit for no clear reason. Follow-up care is a key part of your treatment and safety. Be sure to make and go to all appointments, and call your doctor if you are having problems. It's also a good idea to know your test results and keep a list of the medicines you take. How can you care for yourself at home? · Drink plenty of water and other fluids. This may help soothe a dry or sore throat. Honey or lemon juice in hot water or tea may ease a dry cough. · Prop up your head on pillows to help you breathe and ease a cough. · Do not smoke or allow others to smoke around you. Smoke can make a cough worse. If you need help quitting, talk to your doctor about stop-smoking programs and medicines. These can increase your chances of quitting for good. · Avoid exposure to smoke, dust, or other pollutants, or wear a face mask. Check with your doctor or pharmacist to find out which type of face mask will give you the most benefit. · Take cough medicine as directed by your doctor. · Try cough drops to soothe a dry or sore throat. Cough drops don't stop a cough. Medicine-flavored cough drops are no better than candy-flavored drops or hard candy. Throat clearing When you have a chronic cough or a disease that may cause this type of cough, you may often feel like you want to clear your throat. This helps bring up mucus. But throat clearing does not always have a cause. Throat clearing can become a habit.  The more you do it, the more you feel like you need to do it. But frequent throat clearing can be hard on your vocal cords. It's like slamming them together. To help lessen throat clearing, you can try: · Taking small sips of water. · Not clearing your throat when you feel you need to. · Swallowing hard when you want to clear your throat. You may want to ask your doctor if a medicine that thins mucus would help. When should you call for help? Call 911 anytime you think you may need emergency care. For example, call if: 
? · You have severe trouble breathing. ?Call your doctor now or seek immediate medical care if: 
? · You cough up blood. ? · You have new or worse trouble breathing. ? · You have a new or higher fever. ? Watch closely for changes in your health, and be sure to contact your doctor if: 
? · You cough more deeply or more often, especially if you notice more mucus or a change in the color of your mucus. ? · You do not get better as expected. Where can you learn more? Go to http://alexa-kirby.info/. Enter Z526 in the search box to learn more about \"Chronic Cough: Care Instructions. \" Current as of: May 12, 2017 Content Version: 11.4 © 5600-2995 [employer]. Care instructions adapted under license by [employer] (which disclaims liability or warranty for this information). If you have questions about a medical condition or this instruction, always ask your healthcare professional. [place] [address] any warranty or liability for your use of this information. Introducing [facility]! [facility] introduces [employer] patient portal. Now you can access parts of your medical record, email your doctor's office, and request medication refills online. 1. In your internet browser, go to [email or web address] 2. Click on the First Time User? Click Here link in the Sign In box. You will see the New Member Sign Up page. 3. Enter your SunFunder Access Code exactly as it appears below. You will not need to use this code after youve completed the sign-up process. If you do not sign up before the expiration date, you must request a new code. · SunFunder Access Code: [email or web address] Expires: 3/19/2018  3:06 PM 
 
4. Enter the last four digits of your Social Security Number (xxxx) and Date of Birth (mm/dd/yyyy) as indicated and click Submit. You will be taken to the next sign-up page. 5. Create a SunFunder ID. This will be your SunFunder login ID and cannot be changed, so think of one that is secure and easy to remember. 6. Create a SunFunder password. You can change your password at any time. 7. Enter your Password Reset Question and Answer. This can be used at a later time if you forget your password. 8. Enter your e-mail address. You will receive e-mail notification when new information is available in [address] 9. Click Sign Up. You can now view and download portions of your medical record. 10. Click the Download Summary menu link to download a portable copy of your medical information. If you have questions, please visit the Frequently Asked Questions section of the SunFunder website. Remember, SunFunder is NOT to be used for urgent needs. For medical emergencies, dial 911. Now available from your iPhone and Android! Please provide this summary of care documentation to your next provider. Your primary care clinician is listed as Sal Siu. If you have any questions after today's visit, please call [phone].

## 2017-12-20 NOTE — PROGRESS NOTES
Subjective:   52 y.o. female for Well Woman Check. Patient's last menstrual period was 2017 (approximate). Reports last pap smear was over 5 years ago. Has had abnormal paps in the past but denies any previous treatment. Social History: single partner, contraception - vasectomy. Pertinent past medical hstory: hypertension, current smoker, prediabetes, hyperlipidemia, Lumbar and cervical DDD. Has been diagnosed with Lumbar DDD and has had MARTINEZ x2 with Dr Cata Fuentes; has had Dry needling as well which has helped considerably. Has also been diagnosed with mild Cervical DDD. Uses Diclofenac and Flexeril on prn basis. Has been having chronic cough for years; smokes 1 PPD for 30+ years. Reports her mother  from Lung cancer. Has not noted any shortness of breath. Subjective:   Chauncey Harrell is a 52 y.o. female with hypertension. Hypertension ROS: taking medications as instructed, no medication side effects noted, no TIA's, no chest pain on exertion, no dyspnea on exertion, no swelling of ankles. New concerns: has been tolerating medication without issues. Has been trying to watch her diet and limiting fat and cholesterol. Due to have fasting labs again. Her Hemoglobin A1c was 5.7 in 3/2017; she has been trying to watch sugar in diet. She is not fasting today. Has been told she had heart murmur in past and was advised to see cardiologist but did not follow up with cardiology evaluation. Denies chest pain and shortness of breath. Reports menstrual cycles have been longer and heavier for the past few years. Tends to last about a week. Denies clotting. Reports noting vaginal odor especially during her menstrual cycle. Denies vaginal itching or discharge. Has been treated for BV in the past.    Current Outpatient Prescriptions   Medication Sig Dispense Refill    cyclobenzaprine (FLEXERIL) 10 mg tablet Take 1 Tab by mouth three (3) times daily as needed.  60 Tab 2    diclofenac EC (VOLTAREN) 75 mg EC tablet Take 1 tablet twice daily as needed with food 60 Tab 3    potassium chloride (K-DUR, KLOR-CON) 20 mEq tablet Take 1 Tab by mouth two (2) times a day. 60 Tab 2    valsartan-hydroCHLOROthiazide (DIOVAN-HCT) 320-25 mg per tablet Take 1 Tab by mouth daily. 30 Tab 2    amitriptyline (ELAVIL) 100 mg tablet Take 200 mg by mouth nightly.  clonazePAM (KLONOPIN) 1 mg tablet Take 1 mg by mouth two (2) times a day.  ALPRAZolam (XANAX) 1 mg tablet Take 1 mg by mouth three (3) times daily as needed for Anxiety.  QUEtiapine (SEROQUEL) 50 mg tablet Take 50 mg by mouth nightly.  pantoprazole (PROTONIX) 20 mg tablet Take 1 Tab by mouth daily. 30 Tab 5     No Known Allergies  Past Medical History:   Diagnosis Date    Arthritis     Back     Depression     GERD (gastroesophageal reflux disease)     Hypertension     Insomnia     Pre-diabetes      Past Surgical History:   Procedure Laterality Date    HX COLONOSCOPY  12/2014    HX DILATION AND CURETTAGE      laparoscopy    HX ENDOSCOPY  12/2014    ulcer     Family History   Problem Relation Age of Onset    Cancer Mother      Lung    Hypertension Mother     Cancer Father      stomach    Hypertension Sister     Diabetes Maternal Grandmother     Diabetes Paternal Grandmother      Social History   Substance Use Topics    Smoking status: Current Every Day Smoker     Packs/day: 1.00     Years: 30.00     Types: Cigarettes    Smokeless tobacco: Never Used    Alcohol use 1.8 oz/week     3 Shots of liquor per week      Comment: socially        ROS:  Feeling well. No dyspnea or chest pain on exertion. No abdominal pain, change in bowel habits, black or bloody stools. No urinary tract symptoms. GYN ROS: no breast pain or new or enlarging lumps on self exam, she complains of heavy menstrual cycles. No neurological complaints.     Objective:     Visit Vitals    BP (!) 138/94 (BP 1 Location: Left arm, BP Patient Position: Sitting)    Pulse 90    Temp 98.1 °F (36.7 °C) (Oral)    Resp 12    Ht 5' 4\" (1.626 m)    Wt 185 lb 9.6 oz (84.2 kg)    LMP 12/05/2017 (Approximate)    SpO2 98%    BMI 31.86 kg/m2     The patient appears well, alert, oriented x 3, in no distress. ENT normal.  Neck supple. No adenopathy or thyromegaly. ILENE. Lungs are clear, good air entry, no wheezes, rhonchi or rales. S1 and S2 normal, systolic murmur 1/6 at left sternal border. Abdomen soft without tenderness, guarding, mass or organomegaly. Extremities show no edema, normal peripheral pulses. Neurological is normal, no focal findings. BREAST EXAM: breasts appear normal, no suspicious masses, no skin or nipple changes or axillary nodes    PELVIC EXAM: normal external genitalia, vulva, vagina, cervix, uterus and adnexa    Assessment/Plan:   well woman  mammogram  pap smear  additional lab tests per orders  return annually or prn  Diagnoses and all orders for this visit:    1. Well woman exam with routine gynecological exam  -     [facility] MAMMO BI SCREENING INCL CAD; Future  -     CBC WITH AUTOMATED DIFF  -     VITAMIN D, 25 HYDROXY  -     URINALYSIS W/ RFLX MICROSCOPIC  -     TSH 3RD GENERATION  -     METABOLIC PANEL, COMPREHENSIVE  -     LIPID PANEL  -     PAP IG, APTIMA HPV AND RFX 16/18,45 ([id number])    2. DDD (degenerative disc disease), lumbar  -     cyclobenzaprine (FLEXERIL) 10 mg tablet; Take 1 Tab by mouth three (3) times daily as needed. -     diclofenac EC (VOLTAREN) 75 mg EC tablet; Take 1 tablet twice daily as needed with food    3. DDD (degenerative disc disease), cervical  -     cyclobenzaprine (FLEXERIL) 10 mg tablet; Take 1 Tab by mouth three (3) times daily as needed. -     diclofenac EC (VOLTAREN) 75 mg EC tablet; Take 1 tablet twice daily as needed with food    4. Chronic cough - encouraged smoking cessation  -     XR CHEST PA LAT; Future    5. Essential hypertension  -     METABOLIC PANEL, COMPREHENSIVE    6.  Mixed hyperlipidemia-- will check fasting labs  -     METABOLIC PANEL, COMPREHENSIVE  -     LIPID PANEL    7. Prediabetes  -     HEMOGLOBIN A1C WITH EAG    8. Menorrhagia with regular cycle  -     CBC WITH AUTOMATED DIFF  -     FERRITIN  -     IRON PROFILE    9. Heart murmur  -     2D ECHO COMPLETE ADULT (TTE) W OR WO CONTR; Future    10. Vaginal odor  -     NUSWAB VAGINITIS      lab results and schedule of future lab studies reviewed with patient  reviewed diet, exercise and weight control  very strongly urged to quit smoking to reduce cardiovascular risk  cardiovascular risk and specific lipid/LDL goals reviewed  reviewed medications and side effects in detail.

## 2017-12-22 ENCOUNTER — HOSPITAL ENCOUNTER (OUTPATIENT)
Dept: MAMMOGRAPHY | Age: 47
Discharge: HOME | End: 2017-12-22
Attending: NURSE PRACTITIONER
Payer: COMMERCIAL

## 2017-12-22 ENCOUNTER — HOSPITAL ENCOUNTER (OUTPATIENT)
Dept: GENERAL RADIOLOGY | Age: 47
Discharge: HOME | End: 2017-12-22
Attending: NURSE PRACTITIONER
Payer: COMMERCIAL

## 2017-12-22 DIAGNOSIS — Z01.419 WELL WOMAN EXAM WITH ROUTINE GYNECOLOGICAL EXAM: ICD-10-CM

## 2017-12-22 DIAGNOSIS — R05.3 CHRONIC COUGH: ICD-10-CM

## 2017-12-22 PROCEDURE — 71020 XR CHEST PA LAT: CPT

## 2017-12-22 PROCEDURE — 77067 SCR MAMMO BI INCL CAD: CPT

## 2017-12-27 ENCOUNTER — CLINICAL SUPPORT (OUTPATIENT)
Dept: CARDIOLOGY CLINIC | Age: 47
End: 2017-12-27

## 2017-12-27 DIAGNOSIS — R01.1 HEART MURMUR: ICD-10-CM

## 2017-12-27 NOTE — PROGRESS NOTES
Called pt no answer, LM on VM to return call regarding test results per NP. When pt calls back please notify of the above message.

## 2017-12-28 LAB
A VAGINAE DNA VAG QL NAA+PROBE: ABNORMAL SCORE
BVAB2 DNA VAG QL NAA+PROBE: ABNORMAL SCORE
C ALBICANS DNA VAG QL NAA+PROBE: POSITIVE
C GLABRATA DNA VAG QL NAA+PROBE: NEGATIVE
MEGA1 DNA VAG QL NAA+PROBE: ABNORMAL SCORE
T VAGINALIS RRNA SPEC QL NAA+PROBE: NEGATIVE

## 2017-12-29 NOTE — PROGRESS NOTES
Please call patient -- her vaginal swab was positive for yeast and I am sending in a prescription for Diflucan to her pharmacy.

## 2017-12-29 NOTE — PROGRESS NOTES
I called pt no answer, LM on VM to call back for test results. When pt calls back notify per NP of the above message.

## 2018-01-02 ENCOUNTER — TELEPHONE (OUTPATIENT)
Dept: INTERNAL MEDICINE CLINIC | Age: 48
End: 2018-01-02

## 2018-01-02 NOTE — TELEPHONE ENCOUNTER
Pt states she returning a call to [facility] for her mammogram results. When I spoke with patient earlier she said blood test but actually needs mammo results.

## 2018-01-02 NOTE — TELEPHONE ENCOUNTER
Pt states she's returning a call to [address] for lab results. Request a call back when available.

## 2018-01-03 NOTE — TELEPHONE ENCOUNTER
I spoke with patient and advised her of mammogram results. Pt has another appt scheduled for this Friday.

## 2018-01-05 ENCOUNTER — HOSPITAL ENCOUNTER (OUTPATIENT)
Dept: MAMMOGRAPHY | Age: 48
Discharge: HOME | End: 2018-01-05
Payer: COMMERCIAL

## 2018-01-05 DIAGNOSIS — R92.8 ABNORMAL MAMMOGRAM: ICD-10-CM

## 2018-01-05 PROCEDURE — 77066 DX MAMMO INCL CAD BI: CPT

## 2018-01-05 PROCEDURE — 76642 ULTRASOUND BREAST LIMITED: CPT

## 2018-01-19 ENCOUNTER — TELEPHONE (OUTPATIENT)
Dept: INTERNAL MEDICINE CLINIC | Age: 48
End: 2018-01-19

## 2018-01-19 NOTE — TELEPHONE ENCOUNTER
Pt called stating she saw NP on 12/19/17, she was given Diclofenac and Flexeril for back and left side pain. She was advise that if she was taking the medication daily she needed to call the office back. Pt has to take the Diclofenac daily due to pain being bad. She takes  Diclofenac daily and Flexeril PRN. Her pain scale today 8/10. She has an appt with Dr. Pradip Devi on 02/05/18. Please advise what the patient should do in the meantime?

## 2018-01-22 ENCOUNTER — HOSPITAL ENCOUNTER (EMERGENCY)
Age: 48
Discharge: HOME | End: 2018-01-22
Attending: EMERGENCY MEDICINE | Admitting: EMERGENCY MEDICINE
Payer: COMMERCIAL

## 2018-01-22 VITALS
SYSTOLIC BLOOD PRESSURE: 115 MMHG | DIASTOLIC BLOOD PRESSURE: 84 MMHG | OXYGEN SATURATION: 100 % | HEART RATE: 78 BPM | RESPIRATION RATE: 16 BRPM | TEMPERATURE: 98.4 F

## 2018-01-22 DIAGNOSIS — M51.36 DDD (DEGENERATIVE DISC DISEASE), LUMBAR: ICD-10-CM

## 2018-01-22 DIAGNOSIS — M50.30 DDD (DEGENERATIVE DISC DISEASE), CERVICAL: ICD-10-CM

## 2018-01-22 DIAGNOSIS — G89.29 CHRONIC LEFT-SIDED LOW BACK PAIN, WITH SCIATICA PRESENCE UNSPECIFIED: Primary | ICD-10-CM

## 2018-01-22 DIAGNOSIS — M54.5 CHRONIC LEFT-SIDED LOW BACK PAIN, WITH SCIATICA PRESENCE UNSPECIFIED: Primary | ICD-10-CM

## 2018-01-22 PROCEDURE — 99282 EMERGENCY DEPT VISIT SF MDM: CPT

## 2018-01-22 PROCEDURE — 74011250636 HC RX REV CODE- 250/636: Performed by: EMERGENCY MEDICINE

## 2018-01-22 PROCEDURE — 96372 THER/PROPH/DIAG INJ SC/IM: CPT

## 2018-01-22 RX ORDER — METHOCARBAMOL 750 MG/1
750 TABLET, FILM COATED ORAL 4 TIMES DAILY
Qty: 30 TAB | Refills: 0 | Status: SHIPPED | OUTPATIENT
Start: 2018-01-22 | End: 2018-02-14 | Stop reason: SDUPTHER

## 2018-01-22 RX ORDER — KETOROLAC TROMETHAMINE 30 MG/ML
60 INJECTION, SOLUTION INTRAMUSCULAR; INTRAVENOUS
Status: COMPLETED | OUTPATIENT
Start: 2018-01-22 | End: 2018-01-22

## 2018-01-22 RX ORDER — HYDROCODONE BITARTRATE AND ACETAMINOPHEN 7.5; 325 MG/1; MG/1
1 TABLET ORAL
Qty: 10 TAB | Refills: 0 | Status: SHIPPED | OUTPATIENT
Start: 2018-01-22 | End: 2018-02-14 | Stop reason: ALTCHOICE

## 2018-01-22 RX ADMIN — KETOROLAC TROMETHAMINE 60 MG: 30 INJECTION, SOLUTION INTRAMUSCULAR at 21:02

## 2018-01-22 NOTE — TELEPHONE ENCOUNTER
Informed patient that she needs to speak to Dr. Jenna Weir in regards to her pain. He has been treating her for this and she has an appointment to see him for a follow up. She verbalized an understanding and stated that she will give him a call.

## 2018-01-22 NOTE — TELEPHONE ENCOUNTER
----- Message from Yosi Warren sent at 1/20/2018  9:26 AM EST -----  Regarding: Dr. Lj Owens  Pt is returning a call from Kaden Cope at the office. Message was left for pt  to clarifiy her pain. Pt has been experiencing the following symptoms: Cannot move her left leg , she cannot lay on either side of her body more of her right side, pain is also in her lower back. Best contact [phone] leave message.

## 2018-01-23 NOTE — DISCHARGE INSTRUCTIONS

## 2018-01-23 NOTE — ED PROVIDER NOTES
Patient is a 52 y.o. female presenting with back pain. The history is provided by the patient. Back Pain    This is a chronic problem. The problem has been gradually worsening. The pain is associated with no known injury. The pain is present in the lumbar spine, lower back and left side. The pain is at a severity of 8/10. Pertinent negatives include no chest pain, no fever, no abdominal pain, no bowel incontinence, no perianal numbness, no bladder incontinence and no dysuria. She has tried muscle relaxants and NSAIDs for the symptoms. Past Medical History:   Diagnosis Date    Arthritis     Back     Depression     GERD (gastroesophageal reflux disease)     Hypertension     Insomnia     Pre-diabetes        Past Surgical History:   Procedure Laterality Date    HX COLONOSCOPY  12/2014    HX DILATION AND CURETTAGE      laparoscopy    HX ENDOSCOPY  12/2014    ulcer         Family History:   Problem Relation Age of Onset    Cancer Mother      Lung    Hypertension Mother     Cancer Father      stomach    Hypertension Sister     Diabetes Maternal Grandmother     Diabetes Paternal Grandmother        Social History     Social History    Marital status: SINGLE     Spouse name: N/A    Number of children: N/A    Years of education: N/A     Occupational History    Not on file. Social History Main Topics    Smoking status: Current Every Day Smoker     Packs/day: 1.00     Years: 30.00     Types: Cigarettes    Smokeless tobacco: Never Used    Alcohol use 1.8 oz/week     3 Shots of liquor per week      Comment: socially    Drug use: No    Sexual activity: Yes     Partners: Male     Other Topics Concern    Not on file     Social History Narrative         ALLERGIES: Review of patient's allergies indicates no known allergies. Review of Systems   Constitutional: Negative for chills and fever. Respiratory: Negative for chest tightness and shortness of breath.     Cardiovascular: Negative for chest pain and palpitations. Gastrointestinal: Negative for abdominal pain, bowel incontinence, nausea and vomiting. Genitourinary: Negative for bladder incontinence, dysuria, flank pain and urgency. Musculoskeletal: Positive for back pain. Negative for neck pain. Vitals:    01/22/18 2004 01/22/18 2059   BP: 139/79 115/84   Pulse: 92 78   Resp: 16 16   Temp: 98.4 °F (36.9 °C)    SpO2: 100%             Physical Exam   Constitutional: She is oriented to person, place, and time. She appears well-developed and well-nourished. HENT:   Head: Normocephalic and atraumatic. Cardiovascular: Normal rate, regular rhythm and intact distal pulses. Pulmonary/Chest: Effort normal. No respiratory distress. Abdominal: Soft. There is no tenderness. Musculoskeletal: She exhibits tenderness. She exhibits no deformity. Lumbar back: She exhibits tenderness, pain and spasm. She exhibits no bony tenderness, no swelling and no edema. Back:    Neurological: She is alert and oriented to person, place, and time. Psychiatric: She has a normal mood and affect. Nursing note and vitals reviewed. MDM  Number of Diagnoses or Management Options  Chronic left-sided low back pain, with sciatica presence unspecified:   Diagnosis management comments: Patient with acute flare of chronic back pain - no imaging needed at this time.   Patient ambulatory to the ED and out of the ED - toradol in the ED and d./c home with rx's for further care until she can see Dr. Juan Manuel Hay          ED Course       Procedures

## 2018-01-30 NOTE — MR AVS SNAPSHOT
Visit Information Date & Time Provider Department Dept. Phone Encounter #  
 4/6/2017  9:45 AM Gage Thomason NP [address] [facility] [phone] [id number] Follow-up Instructions Return in about 6 weeks (around 5/18/2017) for repeat fasting labs. Upcoming Health Maintenance Date Due Pneumococcal 19-64 Medium Risk (1 of 1 - PPSV23) 9/10/1989 DTaP/Tdap/Td series (1 - Tdap) 9/10/1991 PAP AKA CERVICAL CYTOLOGY 9/10/1991 Allergies as of 4/6/2017  Review Complete On: 4/6/2017 By: Gage Thomason NP No Known Allergies Current Immunizations  Never Reviewed No immunizations on file. Not reviewed this visit You Were Diagnosed With   
  
 Codes Comments Weakness of both legs    -  Primary ICD-10-CM: R29.898 ICD-9-CM: 729.89 Cramp of both lower extremities     ICD-10-CM: R25.2 ICD-9-CM: 729.82 Hypokalemia     ICD-10-CM: E87.6 ICD-9-CM: 276.8 Mixed hyperlipidemia     ICD-10-CM: E78.2 ICD-9-CM: 272.2 Breast pain, left     ICD-10-CM: N64.4 ICD-9-CM: 611.71 Essential hypertension     ICD-10-CM: I10 
ICD-9-CM: 401.9 Vitals BP Pulse Temp Resp Height(growth percentile) Weight(growth percentile) 124/76 (BP 1 Location: Right arm, BP Patient Position: Sitting) 98 98.4 °F (36.9 °C) (Oral) 18 5' 8\" (1.727 m) 195 lb (88.5 kg) LMP SpO2 BMI OB Status Smoking Status 02/20/2017 98% 29.65 kg/m2 Having regular periods Current Every Day Smoker Vitals History BMI and BSA Data Body Mass Index Body Surface Area  
 29.65 kg/m 2 2.06 m 2 Preferred Pharmacy Pharmacy Name Phone [facility] [address], [address] AT [facility] OF  [facility] [phone] Your Updated Medication List  
  
   
This list is accurate as of: 4/6/17 10:39 AM.  Always use your most recent med list.  
  
  
  
  
 amitriptyline 100 mg tablet Commonly known as:  ELAVIL  
 Take 200 mg by mouth nightly. clonazePAM 1 mg tablet Commonly known as:  Jaswant Less Take 1 mg by mouth two (2) times a day. pantoprazole 20 mg tablet Commonly known as:  PROTONIX Take 1 Tab by mouth daily. potassium chloride 20 mEq tablet Commonly known as:  K-DUR, KLOR-CON Take 1 Tab by mouth two (2) times a day. QUEtiapine 50 mg tablet Commonly known as:  SEROquel Take 50 mg by mouth nightly. valsartan-hydroCHLOROthiazide 320-25 mg per tablet Commonly known as:  DIOVAN-HCT Take 1 Tab by mouth daily. XANAX 1 mg tablet Generic drug:  ALPRAZolam  
Take 1 mg by mouth three (3) times daily as needed for Anxiety. Prescriptions Sent to Pharmacy Refills  
 potassium chloride (K-DUR, KLOR-CON) 20 mEq tablet 2 Sig: Take 1 Tab by mouth two (2) times a day. Class: Normal  
 [facility] [address] [place] [address] #: [phone] Route: Oral  
  
Follow-up Instructions Return in about 6 weeks (around 5/18/2017) for repeat fasting labs. Patient Instructions Heart-Healthy Diet: Care Instructions Your Care Instructions A heart-healthy diet has lots of vegetables, fruits, nuts, beans, and whole grains, and is low in salt. It limits foods that are high in saturated fat, such as meats, cheeses, and fried foods. It may be hard to change your diet, but even small changes can lower your risk of heart attack and heart disease. Follow-up care is a key part of your treatment and safety. Be sure to make and go to all appointments, and call your doctor if you are having problems. It's also a good idea to know your test results and keep a list of the medicines you take. How can you care for yourself at home? Watch your portions · Learn what a serving is.  A \"serving\" and a \"portion\" are not always the same thing. Make sure that you are not eating larger portions than are recommended. For example, a serving of pasta is ½ cup. A serving size of meat is 2 to 3 ounces. A 3-ounce serving is about the size of a deck of cards. Measure serving sizes until you are good at [place]" them. Keep in mind that restaurants often serve portions that are 2 or 3 times the size of one serving. · To keep your energy level up and keep you from feeling hungry, eat often but in smaller portions. · Eat only the number of calories you need to stay at a healthy weight. If you need to lose weight, eat fewer calories than your body burns (through exercise and other physical activity). Eat more fruits and vegetables · Eat a variety of fruit and vegetables every day. Dark green, deep orange, red, or yellow fruits and vegetables are especially good for you. Examples include spinach, carrots, peaches, and berries. · Keep carrots, celery, and other veggies handy for snacks. Buy fruit that is in season and store it where you can see it so that you will be tempted to eat it. · Cook dishes that have a lot of veggies in them, such as stir-fries and soups. Limit saturated and trans fat · Read food labels, and try to avoid saturated and trans fats. They increase your risk of heart disease. Trans fat is found in many processed foods such as cookies and crackers. · Use olive or canola oil when you cook. Try cholesterol-lowering spreads, such as Benecol or Take Control. · Bake, broil, grill, or steam foods instead of frying them. · Choose lean meats instead of high-fat meats such as hot dogs and sausages. Cut off all visible fat when you prepare meat. · Eat fish, skinless poultry, and meat alternatives such as soy products instead of high-fat meats. Soy products, such as tofu, may be especially good for your heart. · Choose low-fat or fat-free milk and dairy products. Eat fish · Eat at least two servings of fish a week. Certain fish, such as salmon and tuna, contain omega-3 fatty acids, which may help reduce your risk of heart attack. Eat foods high in fiber · Eat a variety of grain products every day. Include whole-grain foods that have lots of fiber and nutrients. Examples of whole-grain foods include oats, whole wheat bread, and brown rice. · Buy whole-grain breads and cereals, instead of white bread or pastries. Limit salt and sodium · Limit how much salt and sodium you eat to help lower your blood pressure. · Taste food before you salt it. Add only a little salt when you think you need it. With time, your taste buds will adjust to less salt. · Eat fewer snack items, fast foods, and other high-salt, processed foods. Check food labels for the amount of sodium in packaged foods. · Choose low-sodium versions of canned goods (such as soups, vegetables, and beans). Limit sugar · Limit drinks and foods with added sugar. These include candy, desserts, and soda pop. Limit alcohol · Limit alcohol to no more than 2 drinks a day for men and 1 drink a day for women. Too much alcohol can cause health problems. When should you call for help? Watch closely for changes in your health, and be sure to contact your doctor if: 
· You would like help planning heart-healthy meals. Where can you learn more? Go to http://alexa-kirby.info/. Enter V137 in the search box to learn more about \"Heart-Healthy Diet: Care Instructions. \" Current as of: January 27, 2016 Content Version: 11.2 © 0978-2498 rankdesk. Care instructions adapted under license by [employer] (which disclaims liability or warranty for this information). If you have questions about a medical condition or this instruction, always ask your healthcare professional. [address] any warranty or liability for your use of this information. Introducing \[facility] [place] [facility]\"" & HEALTH SERVICES! [facility] introduces grabHalo patient portal. Now you can access parts of your medical record, email your doctor's office, and request medication refills online. 1. In your internet browser, go to [email or web address] 2. Click on the First Time User? Click Here link in the Sign In box. You will see the New Member Sign Up page. 3. Enter your grabHalo Access Code exactly as it appears below. You will not need to use this code after youve completed the sign-up process. If you do not sign up before the expiration date, you must request a new code. · grabHalo Access Code: [email or web address] Expires: 6/14/2017  1:37 PM 
 
4. Enter the last four digits of your Social Security Number (xxxx) and Date of Birth (mm/dd/yyyy) as indicated and click Submit. You will be taken to the next sign-up page. 5. Create a grabHalo ID. This will be your grabHalo login ID and cannot be changed, so think of one that is secure and easy to remember. 6. Create a grabHalo password. You can change your password at any time. 7. Enter your Password Reset Question and Answer. This can be used at a later time if you forget your password. 8. Enter your e-mail address. You will receive e-mail notification when new information is available in [address] 9. Click Sign Up. You can now view and download portions of your medical record. 10. Click the Download Summary menu link to download a portable copy of your medical information. If you have questions, please visit the Frequently Asked Questions section of the grabHalo website. Remember, grabHalo is NOT to be used for urgent needs. For medical emergencies, dial 911. Now available from your iPhone and Android! Please provide this summary of care documentation to your next provider. Your primary care clinician is listed as Yolis Brantley. If you have any questions after today's visit, please call [phone]. 30-Jan-2018 09:55:42

## 2018-02-07 ENCOUNTER — TELEPHONE (OUTPATIENT)
Dept: INTERNAL MEDICINE CLINIC | Age: 48
End: 2018-02-07

## 2018-02-07 NOTE — TELEPHONE ENCOUNTER
----- Message from Lucy Severino sent at 2/7/2018  1:14 PM EST -----  Regarding: Dr Hanson/Phone  Pt requesting call back at [phone]. She saw Dr. Shanika Kaiser, today and he is doing an epidural block on 2/27/18 for a bulging disc and spinal stenosis. She needs help filling out FMLA paper work and his office does not do this. He is sending you a letter to tell you about her visit. Please call about FMLA papers. She is calling [employer] today to get claim numbers.

## 2018-02-14 ENCOUNTER — OFFICE VISIT (OUTPATIENT)
Dept: INTERNAL MEDICINE CLINIC | Age: 48
End: 2018-02-14

## 2018-02-14 VITALS
HEIGHT: 64 IN | OXYGEN SATURATION: 99 % | DIASTOLIC BLOOD PRESSURE: 78 MMHG | WEIGHT: 191.13 LBS | TEMPERATURE: 99.1 F | HEART RATE: 108 BPM | SYSTOLIC BLOOD PRESSURE: 130 MMHG | RESPIRATION RATE: 18 BRPM | BODY MASS INDEX: 32.63 KG/M2

## 2018-02-14 DIAGNOSIS — I10 ESSENTIAL HYPERTENSION: Primary | ICD-10-CM

## 2018-02-14 DIAGNOSIS — M50.30 DDD (DEGENERATIVE DISC DISEASE), CERVICAL: ICD-10-CM

## 2018-02-14 DIAGNOSIS — M51.36 DDD (DEGENERATIVE DISC DISEASE), LUMBAR: ICD-10-CM

## 2018-02-14 PROBLEM — R73.03 PREDIABETES: Status: RESOLVED | Noted: 2017-03-23 | Resolved: 2018-02-14

## 2018-02-14 RX ORDER — METHOCARBAMOL 750 MG/1
750 TABLET, FILM COATED ORAL
COMMUNITY
End: 2018-12-17

## 2018-02-14 RX ORDER — GABAPENTIN 600 MG/1
600 TABLET ORAL 2 TIMES DAILY
Qty: 1 TAB | Refills: 1
Start: 2018-02-14 | End: 2022-07-29

## 2018-02-14 RX ORDER — PANTOPRAZOLE SODIUM 20 MG/1
20 TABLET, DELAYED RELEASE ORAL
COMMUNITY
End: 2018-11-29 | Stop reason: ALTCHOICE

## 2018-02-14 RX ORDER — GABAPENTIN 300 MG/1
CAPSULE ORAL
Refills: 2 | COMMUNITY
Start: 2018-02-07 | End: 2018-02-14 | Stop reason: SDUPTHER

## 2018-02-14 RX ORDER — GABAPENTIN 600 MG/1
TABLET ORAL DAILY
COMMUNITY
End: 2018-02-14 | Stop reason: SDUPTHER

## 2018-02-14 NOTE — PROGRESS NOTES
HISTORY OF PRESENT ILLNESS  Gio John is a 52 y.o. female. HPI  Upper back muscle spasm and lower back pain with cervical and lumbar spinal stenosis. Using muscle relaxants and gabapentin for pain. Has had referred pain to scalp causing intermittant severe headaches. To get epidural injection for neck by Dr. Fran Cardenas - 2/22/18  She needs FMLA forms completed for return to work around 2/26 after epidural.    Hypertension:  Gio John is a 52 y.o. female with hypertension. without Chronic kidney disease stage    Medication change since last visit: No  The patient reports:  taking medications as instructed, no medication side effects noted, home BP monitoring in range of 181'G systolic over 70'P diastolic, no TIA's, no chest pain on exertion, no dyspnea on exertion, no swelling of ankles, no orthostatic dizziness or lightheadedness, no palpitations. Lifestyle modification/social history: generally follows a low fat low cholesterol diet, generally follows a low sodium diet    Lab Results   Component Value Date/Time    Sodium 138 08/15/2017 02:31 PM    Potassium 4.3 08/15/2017 02:31 PM    Chloride 97 08/15/2017 02:31 PM    CO2 27 08/15/2017 02:31 PM    Glucose 95 08/15/2017 02:31 PM    BUN 11 08/15/2017 02:31 PM    Creatinine 0.95 08/15/2017 02:31 PM    BUN/Creatinine ratio 12 08/15/2017 02:31 PM    GFR est AA 83 08/15/2017 02:31 PM    GFR est non-AA 72 08/15/2017 02:31 PM    Calcium 9.2 08/15/2017 02:31 PM           ROS    Physical Exam   Constitutional: She appears well-developed and well-nourished. No distress. /85 (BP 1 Location: Left arm, BP Patient Position: Sitting)  Pulse (!) 108  Temp 99.1 °F (37.3 °C) (Oral)   Resp 18  Ht 5' 4\" (1.626 m)  Wt 191 lb 2 oz (86.7 kg)  SpO2 99%  BMI 32.81 kg/m2Body mass index is 32.81 kg/(m^2). HENT:   Mouth/Throat: Oropharynx is clear and moist.   Neck: No JVD present. Carotid bruit is not present.    Cardiovascular: Normal rate, regular rhythm, normal heart sounds and intact distal pulses. Pulmonary/Chest: Effort normal and breath sounds normal.   Musculoskeletal: She exhibits no edema. Neurological: She is alert. Skin: Skin is warm and dry. She is not diaphoretic. Nursing note and vitals reviewed. Repeat vitals were done by me and were:  Visit Vitals    /78    Pulse (!) 108    Temp 99.1 °F (37.3 °C) (Oral)    Resp 18    Ht 5' 4\" (1.626 m)    Wt 191 lb 2 oz (86.7 kg)    SpO2 99%    BMI 32.81 kg/m2         ASSESSMENT and PLAN  Diagnoses and all orders for this visit:    1. Essential hypertension - Well controlled and stable. her medications were reviewed and refilled where necessary as noted below. Labs ordered as noted. -     METABOLIC PANEL, BASIC    2. DDD (degenerative disc disease), cervical  -     gabapentin (NEURONTIN) 600 mg tablet; Take 1 Tab by mouth two (2) times a day. 3. DDD (degenerative disc disease), lumbar  -     gabapentin (NEURONTIN) 600 mg tablet; Take 1 Tab by mouth two (2) times a day. Follow-up Disposition:  Return in about 6 months (around 8/14/2018).

## 2018-02-14 NOTE — MR AVS SNAPSHOT
[address] [facility] 
 
 
 [address] [place] [address] Road 
[phone] Patient: Froy Nevarez MRN: [id number] THW:[birth year] Visit Information Date & Time Provider Department Dept. Phone Encounter #  
 2/14/2018 10:30 AM Marah England MD [facility] [address] [facility] [id number] Follow-up Instructions Return in about 6 months (around 8/14/2018). Upcoming Health Maintenance Date Due Pneumococcal 19-64 Medium Risk (1 of 1 - PPSV23) 9/10/1989 PAP AKA CERVICAL CYTOLOGY 12/19/2020 DTaP/Tdap/Td series (2 - Td) 6/11/2027 Allergies as of 2/14/2018  Review Complete On: 1/22/2018 By: Natalie Jonas No Known Allergies Current Immunizations  Reviewed on 12/19/2017 Name Date Hep A Vaccine 6/11/2017 Hep B Vaccine 6/11/2017 Influenza Vaccine 11/28/2017 Tdap 6/11/2017 Not reviewed this visit You Were Diagnosed With   
  
 Codes Comments Essential hypertension    -  Primary ICD-10-CM: I10 
ICD-9-CM: 401.9 DDD (degenerative disc disease), cervical     ICD-10-CM: M50.30 ICD-9-CM: 722.4 DDD (degenerative disc disease), lumbar     ICD-10-CM: M51.36 
ICD-9-CM: 722.52 Vitals BP Pulse Temp Resp Height(growth percentile) Weight(growth percentile) 128/80 (!) 108 99.1 °F (37.3 °C) (Oral) 18 5' 4\" (1.626 m) 191 lb 2 oz (86.7 kg) SpO2 BMI OB Status Smoking Status 99% 32.81 kg/m2 Having regular periods Current Every Day Smoker Vitals History BMI and BSA Data Body Mass Index Body Surface Area  
 32.81 kg/m 2 1.98 m 2 Preferred Pharmacy Pharmacy Name Phone [facility] [phone] [facility] [phone] Your Updated Medication List  
  
   
This list is accurate as of: 2/14/18 10:58 AM.  Always use your most recent med list.  
  
  
  
  
 amitriptyline 100 mg tablet Commonly known as:  ELAVIL Take 200 mg by mouth nightly. clonazePAM 1 mg tablet Commonly known as:  Ofe Alonso Take 1 mg by mouth two (2) times a day.  
  
 gabapentin 600 mg tablet Commonly known as:  NEURONTIN Take 1 Tab by mouth two (2) times a day. methocarbamol 750 mg tablet Commonly known as:  ROBAXIN Take 750 mg by mouth daily as needed. potassium chloride 20 mEq tablet Commonly known as:  K-DUR, KLOR-CON Take 1 Tab by mouth two (2) times a day. PROTONIX 20 mg tablet Generic drug:  pantoprazole Take 20 mg by mouth daily as needed. QUEtiapine 50 mg tablet Commonly known as:  SEROquel Take 50 mg by mouth nightly. valsartan-hydroCHLOROthiazide 320-25 mg per tablet Commonly known as:  DIOVAN-HCT Take 1 Tab by mouth daily. XANAX 1 mg tablet Generic drug:  ALPRAZolam  
Take 1 mg by mouth three (3) times daily as needed for Anxiety. We Performed the Following METABOLIC PANEL, BASIC [78255 CPT(R)] Follow-up Instructions Return in about 6 months (around 8/14/2018). Introducing [facility] & HEALTH SERVICES! Katelyn Green introduces [employer] patient portal. Now you can access parts of your medical record, email your doctor's office, and request medication refills online. 1. In your internet browser, go to [email or web address] 2. Click on the First Time User? Click Here link in the Sign In box. You will see the New Member Sign Up page. 3. Enter your [employer] Access Code exactly as it appears below. You will not need to use this code after youve completed the sign-up process. If you do not sign up before the expiration date, you must request a new code. · [employer] Access Code: [email or web address] Expires: 3/19/2018  3:06 PM 
 
4. Enter the last four digits of your Social Security Number (xxxx) and Date of Birth (mm/dd/yyyy) as indicated and click Submit.  You will be taken to the next sign-up page. 5. Create a [employer] ID. This will be your [employer] login ID and cannot be changed, so think of one that is secure and easy to remember. 6. Create a [employer] password. You can change your password at any time. 7. Enter your Password Reset Question and Answer. This can be used at a later time if you forget your password. 8. Enter your e-mail address. You will receive e-mail notification when new information is available in [address] 9. Click Sign Up. You can now view and download portions of your medical record. 10. Click the Download Summary menu link to download a portable copy of your medical information. If you have questions, please visit the Frequently Asked Questions section of the [employer] website. Remember, [employer] is NOT to be used for urgent needs. For medical emergencies, dial 911. Now available from your iPhone and Android! Please provide this summary of care documentation to your next provider. Your primary care clinician is listed as Reyna Frank. If you have any questions after today's visit, please call [phone].

## 2018-02-15 LAB
BUN SERPL-MCNC: 8 MG/DL (ref 6–24)
BUN/CREAT SERPL: 9 (ref 9–23)
CALCIUM SERPL-MCNC: 9.9 MG/DL (ref 8.7–10.2)
CHLORIDE SERPL-SCNC: 93 MMOL/L (ref 96–106)
CO2 SERPL-SCNC: 29 MMOL/L (ref 18–29)
CREAT SERPL-MCNC: 0.93 MG/DL (ref 0.57–1)
GFR SERPLBLD CREATININE-BSD FMLA CKD-EPI: 73 ML/MIN/1.73
GFR SERPLBLD CREATININE-BSD FMLA CKD-EPI: 85 ML/MIN/1.73
GLUCOSE SERPL-MCNC: 95 MG/DL (ref 65–99)
POTASSIUM SERPL-SCNC: 3.9 MMOL/L (ref 3.5–5.2)
SODIUM SERPL-SCNC: 137 MMOL/L (ref 134–144)

## 2018-02-20 ENCOUNTER — TELEPHONE (OUTPATIENT)
Dept: INTERNAL MEDICINE CLINIC | Age: 48
End: 2018-02-20

## 2018-02-20 NOTE — TELEPHONE ENCOUNTER
----- Message from [facility] sent at 2/20/2018 12:00 PM EST -----  Regarding: Dr. Randee Brush  Pt is requesting a call from the nurse about paperwork being sent to [employer]  about short term disability.   Pt best contact [phone]

## 2018-02-20 NOTE — TELEPHONE ENCOUNTER
----- Message from David Webster sent at 2/20/2018 12:51 PM EST -----  Regarding: Dr. Elizabeth Sicard  Pt is calling to speak with the nurse regarding fmla paperwork. The best contact is [phone]. Pt stated she has more paperwork to secure her  job  that needs to be filled out. She stated it was a  miscommunication between pt and [employer].

## 2018-02-21 ENCOUNTER — TELEPHONE (OUTPATIENT)
Dept: INTERNAL MEDICINE CLINIC | Age: 48
End: 2018-02-21

## 2018-02-21 NOTE — TELEPHONE ENCOUNTER
Pt left forms for the doctor to complete. She had forms last week and an appointment with him. Please review and complete. Pt would like copies of all forms and she is going back to work on Monday the 26th of this month. Any questions please call pt on home number if you have any questions and when the forms are completed. I placed them in the doctor's mailbox at the .      thanks

## 2018-02-22 NOTE — TELEPHONE ENCOUNTER
Patient request a return call regarding extending  dates for [place] paper work to the end of March instead of February unable to get correct injection for back today.  Patient best contact  [phone] (C)

## 2018-02-23 NOTE — TELEPHONE ENCOUNTER
Pt was able to get her injection today so needs Dr Sarah Wilkerson to write note to return to work on Monday and she can pick it up today. Please call her at [phone].

## 2018-02-26 ENCOUNTER — TELEPHONE (OUTPATIENT)
Dept: INTERNAL MEDICINE CLINIC | Age: 48
End: 2018-02-26

## 2018-02-26 NOTE — TELEPHONE ENCOUNTER
Form faxed and original mailed to home address on file. LM informed patient of form completion. Copy made to can into chart.

## 2018-02-26 NOTE — LETTER
NOTIFICATION RETURN TO WORK / SCHOOL 
 
2/26/2018 11:56 AM 
 
Ms. Ansley Tineo [facility] [id number] [facility] [address] To Whom It May Concern: 
 
Ansley Tineo is currently under the care of [address] [place] [address],[address]. She will return to work/school on: 2/26/18 If there are questions or concerns please have the patient contact our office. Sincerely, Mitchell Snow MD

## 2018-02-27 ENCOUNTER — TELEPHONE (OUTPATIENT)
Dept: INTERNAL MEDICINE CLINIC | Age: 48
End: 2018-02-27

## 2018-02-27 NOTE — TELEPHONE ENCOUNTER
Patient needs a letter sent to her job asap that says she could return to work but it has to say without restrictions. It needs to be faxed to [phone] Attn: Chen Tabares. This needs to be done ASAP. The letter she received yesterday isn't enough for job.

## 2018-02-27 NOTE — LETTER
NOTIFICATION RETURN TO WORK / SCHOOL 
 
2/28/2018 8:01 AM 
 
Ms. Samantha Arzola [facility] [address] [id number] [facility] [address] To Whom It May Concern: 
 
Samantha Arzola is currently under the care of [address] [place] [address],[address]. She will return to work/school on: 2/26/18 WITHOUT RESTRICTIONS If there are questions or concerns please have the patient contact our office. Sincerely, Taylor Boas, MD

## 2018-04-10 ENCOUNTER — TELEPHONE (OUTPATIENT)
Dept: INTERNAL MEDICINE CLINIC | Age: 48
End: 2018-04-10

## 2018-04-10 NOTE — TELEPHONE ENCOUNTER
----- Message from Kendra Muniz sent at 4/10/2018 12:31 PM EDT -----  Regarding: Dr. Carrero Grandchild  Pt is requesting a call back for a recommended neurosurgeon for her sharp pain and muscle spasms in her upper arm. Pt pain is from bulging disk and can they faxed over pt medical records to whoever the doctor recommends.     Best contact number [phone]

## 2018-04-17 ENCOUNTER — TELEPHONE (OUTPATIENT)
Dept: INTERNAL MEDICINE CLINIC | Age: 48
End: 2018-04-17

## 2018-04-17 NOTE — TELEPHONE ENCOUNTER
She will need to increase her gabapentin to 600mg TID then 1200mg bid if not helping after 3 days. Please let her know. We can order more for her if need be.   thanks

## 2018-04-17 NOTE — TELEPHONE ENCOUNTER
Regarding her bulging disc and pinch nerve in her neck. She has had the Epidural which did not help. She is going to see Dr Cha Almaraz at [facility] on May I,2018. And the  gabapentin (NEURONTIN) 600 mg tablet is not helping her at all having burning in both arms and her Bicep also Muscles Spasm needs some thing else.  Please leave the answer on her answering machine she is at work and can not answer her phone no [phone]

## 2018-04-18 NOTE — TELEPHONE ENCOUNTER
Patient informed that per Dr. Susan Scott, increase her gabapentin to 600mg TID then 1200mg BID if not helping after 3 days. She's already takes 600 mg TID and will take 1200mg BID per Dr. Susan Scott.

## 2018-04-24 ENCOUNTER — OFFICE VISIT (OUTPATIENT)
Dept: INTERNAL MEDICINE CLINIC | Age: 48
End: 2018-04-24

## 2018-04-24 VITALS
BODY MASS INDEX: 33.33 KG/M2 | HEART RATE: 92 BPM | SYSTOLIC BLOOD PRESSURE: 136 MMHG | TEMPERATURE: 98.6 F | RESPIRATION RATE: 18 BRPM | OXYGEN SATURATION: 97 % | DIASTOLIC BLOOD PRESSURE: 94 MMHG | WEIGHT: 195.25 LBS | HEIGHT: 64 IN

## 2018-04-24 DIAGNOSIS — M54.12 CERVICAL RADICULAR PAIN: ICD-10-CM

## 2018-04-24 DIAGNOSIS — M75.81 TENDINITIS OF RIGHT ROTATOR CUFF: Primary | ICD-10-CM

## 2018-04-24 NOTE — MR AVS SNAPSHOT
[address] [facility], [place], operated by [facility] 
 
 
 [address] [place] [address] [facility] 
[phone] Patient: Demetria Thornton MRN: [id number] Visit Information Date & Time Provider Department Dept. Phone Encounter #  
 4/24/2018  4:00 PM Rosalia Hardy MD [facility] [address] [id number] Follow-up Instructions Return if symptoms worsen or fail to improve. Upcoming Health Maintenance Date Due Pneumococcal 19-64 Medium Risk (1 of 1 - PPSV23) 9/10/1989 PAP AKA CERVICAL CYTOLOGY 12/19/2020 DTaP/Tdap/Td series (2 - Td) 6/11/2027 Allergies as of 4/24/2018  Review Complete On: 1/22/2018 By: Cary Hendrix No Known Allergies Current Immunizations  Reviewed on 12/19/2017 Name Date Hep A Vaccine 6/11/2017 Hep B Vaccine 6/11/2017 Influenza Vaccine 11/28/2017 Tdap 6/11/2017 Not reviewed this visit You Were Diagnosed With   
  
 Codes Comments Tendinitis of right rotator cuff    -  Primary ICD-10-CM: M75.81 ICD-9-CM: 726.10 Cervical radicular pain     ICD-10-CM: M54.12 
ICD-9-CM: 723.4 Vitals BP Pulse Temp Resp Height(growth percentile) Weight(growth percentile) (!) 136/94 (BP 1 Location: Left arm, BP Patient Position: Sitting) 92 98.6 °F (37 °C) (Oral) 18 5' 4\" (1.626 m) 195 lb 4 oz (88.6 kg) SpO2 BMI OB Status Smoking Status 97% 33.51 kg/m2 Having regular periods Current Every Day Smoker Vitals History BMI and BSA Data Body Mass Index Body Surface Area  
 33.51 kg/m 2 2 m 2 Preferred Pharmacy Pharmacy Name Phone Darrell Yang [id number] [facility] OF  [place] [facility] [phone] Your Updated Medication List  
  
   
This list is accurate as of 4/24/18  4:38 PM.  Always use your most recent med list.  
  
  
  
  
 amitriptyline 100 mg tablet Commonly known as:  ELAVIL  
 Take 200 mg by mouth nightly. clonazePAM 1 mg tablet Commonly known as:  Cameron Furnish Take 1 mg by mouth two (2) times a day.  
  
 gabapentin 600 mg tablet Commonly known as:  NEURONTIN Take 1 Tab by mouth two (2) times a day. methocarbamol 750 mg tablet Commonly known as:  ROBAXIN Take 750 mg by mouth daily as needed. potassium chloride 20 mEq tablet Commonly known as:  K-DUR, KLOR-CON Take 1 Tab by mouth two (2) times a day. PROTONIX 20 mg tablet Generic drug:  pantoprazole Take 20 mg by mouth daily as needed. QUEtiapine 50 mg tablet Commonly known as:  SEROquel Take 50 mg by mouth nightly. valsartan-hydroCHLOROthiazide 320-25 mg per tablet Commonly known as:  DIOVAN-HCT  
TAKE 1 TABLET BY MOUTH DAILY  
  
 XANAX 1 mg tablet Generic drug:  ALPRAZolam  
Take 1 mg by mouth three (3) times daily as needed for Anxiety. We Performed the Following REFERRAL TO PHYSICAL THERAPY [CJS06 Custom] Follow-up Instructions Return if symptoms worsen or fail to improve. Referral Information Referral ID Referred By Referred To  
  
 [id number] SALLIE, [address] [place] [address] [address] [facility], [place] Diane Phone: [phone] Visits Status Start Date End Date 1 New Request 4/24/18 4/24/19 If your referral has a status of pending review or denied, additional information will be sent to support the outcome of this decision. Introducing [facility] & HEALTH SERVICES! [facility] introduces Share0 patient portal. Now you can access parts of your medical record, email your doctor's office, and request medication refills online. 1. In your internet browser, go to [email or web address] 2. Click on the First Time User? Click Here link in the Sign In box. You will see the New Member Sign Up page. 3. Enter your Share0 Access Code exactly as it appears below.  You will not need to use this code after youve completed the sign-up process. If you do not sign up before the expiration date, you must request a new code. · [facility] Access Code: [email or web address] Expires: 7/23/2018  4:38 PM 
 
4. Enter the last four digits of your Social Security Number (xxxx) and Date of Birth (mm/dd/yyyy) as indicated and click Submit. You will be taken to the next sign-up page. 5. Create a [facility] ID. This will be your [facility] login ID and cannot be changed, so think of one that is secure and easy to remember. 6. Create a [facility] password. You can change your password at any time. 7. Enter your Password Reset Question and Answer. This can be used at a later time if you forget your password. 8. Enter your e-mail address. You will receive e-mail notification when new information is available in [address] 9. Click Sign Up. You can now view and download portions of your medical record. 10. Click the Download Summary menu link to download a portable copy of your medical information. If you have questions, please visit the Frequently Asked Questions section of the [facility] website. Remember, [facility] is NOT to be used for urgent needs. For medical emergencies, dial 911. Now available from your iPhone and Android! Please provide this summary of care documentation to your next provider. Your primary care clinician is listed as Darcie Álvarez. If you have any questions after today's visit, please call [phone].

## 2018-04-24 NOTE — PROGRESS NOTES
HISTORY OF PRESENT ILLNESS  Alfa Mazariegos is a 52 y.o. female. HPI  Problem visit:  Alfa Mazariegos is here for complaint of R arm pain. Problem began 1 month(s) ago. Severity is moderate  Character of problem: cramping, pain in upper R arm   Raising R arm, reaching backward makes the problem worse. nothing makes the problem better. Associated symptoms include: she has hx of cervical radicular pain in both arms, seeing Dr. Sherin Marrero   Treatments tried include: medication not used      ROS    Physical Exam   Visit Vitals    BP (!) 136/94 (BP 1 Location: Left arm, BP Patient Position: Sitting)    Pulse 92    Temp 98.6 °F (37 °C) (Oral)    Resp 18    Ht 5' 4\" (1.626 m)    Wt 195 lb 4 oz (88.6 kg)    SpO2 97%    BMI 33.51 kg/m2     right shoulder exam:    ROM: full range with pain on internal rotation, abduction  Crepitus: NO  Shoulder  is tender to palpation superiorly    NEER test: positive  Woodson test: positive  Cross over test:  positive  L shoulder - negative    ASSESSMENT and PLAN  Diagnoses and all orders for this visit:    1. Tendinitis of right rotator cuff -she has tried nsaids without relief. -     [address]    2. Cervical radicular pain - burning pain to arms bilaterally. She is to see neurosurgeon next week for evaluation. Follow-up Disposition:  Return if symptoms worsen or fail to improve.

## 2018-06-22 ENCOUNTER — TELEPHONE (OUTPATIENT)
Dept: INTERNAL MEDICINE CLINIC | Age: 48
End: 2018-06-22

## 2018-06-22 NOTE — TELEPHONE ENCOUNTER
----- Message from Charles Joseph sent at 6/22/2018 11:40 AM EDT -----  Regarding: Dr Hanson/Phone  Patient is requesting that her recent labs be faxed over to Dr Ayana Barahona, Endocrinology, fax# [phone]. She has appt with him next Thursday for a nodule on her thyroid. She would like a call to let her know this has been done. Her number is [phone]. If you get her VM, please leave a message saying that t it is done.

## 2018-06-29 ENCOUNTER — TELEPHONE (OUTPATIENT)
Dept: INTERNAL MEDICINE CLINIC | Age: 48
End: 2018-06-29

## 2018-06-29 NOTE — TELEPHONE ENCOUNTER
Shabana jurado/ CYNTHIA [facility] Pre Admissions request patient's last office notes  & Echocardiogram faxed to [phone] attention Shabana

## 2018-09-11 ENCOUNTER — HOSPITAL ENCOUNTER (OUTPATIENT)
Dept: GENERAL RADIOLOGY | Age: 48
Discharge: HOME | End: 2018-09-11
Attending: NEUROLOGICAL SURGERY
Payer: COMMERCIAL

## 2018-09-11 DIAGNOSIS — Z09 STATUS POST NECK SURGERY, FOLLOW-UP EXAM: ICD-10-CM

## 2018-09-11 PROCEDURE — 72040 X-RAY EXAM NECK SPINE 2-3 VW: CPT

## 2018-11-27 LAB
CREATININE, EXTERNAL: 0.8
HBA1C MFR BLD HPLC: 5.7 %
LDL-C, EXTERNAL: 95

## 2018-11-28 ENCOUNTER — TELEPHONE (OUTPATIENT)
Dept: ENDOCRINOLOGY | Age: 48
End: 2018-11-28

## 2018-11-28 NOTE — TELEPHONE ENCOUNTER
Jadiel Traore Bread! A nurse from Dr. Sharlene pan office called and she would like to know if Ms. Thad Krabbe can be seen as soon as possible for hyperthyroidism. The nurse said that Dr. Stephen Chappell would like to talk with you about this when you have a moment. Office number is [phone]. Dr. Marleen Castro cell phone number is [phone]. Thanks [facility].

## 2018-11-28 NOTE — TELEPHONE ENCOUNTER
Called and left message for Dr Oriana Robin on her cell phone. Provided my number to call me back. Office number just rang - likely after office hours.

## 2018-11-29 ENCOUNTER — OFFICE VISIT (OUTPATIENT)
Dept: ENDOCRINOLOGY | Age: 48
End: 2018-11-29

## 2018-11-29 VITALS
BODY MASS INDEX: 32.81 KG/M2 | HEART RATE: 109 BPM | HEIGHT: 64 IN | WEIGHT: 192.2 LBS | OXYGEN SATURATION: 100 % | SYSTOLIC BLOOD PRESSURE: 130 MMHG | RESPIRATION RATE: 14 BRPM | DIASTOLIC BLOOD PRESSURE: 93 MMHG

## 2018-11-29 DIAGNOSIS — R23.2 HOT FLASHES: ICD-10-CM

## 2018-11-29 DIAGNOSIS — E05.90 HYPERTHYROIDISM: Primary | ICD-10-CM

## 2018-11-29 DIAGNOSIS — N91.2 AMENORRHEA: ICD-10-CM

## 2018-11-29 RX ORDER — BACLOFEN 10 MG/1
TABLET ORAL
Refills: 0 | COMMUNITY
Start: 2018-11-27 | End: 2018-11-29 | Stop reason: ALTCHOICE

## 2018-11-29 RX ORDER — CARVEDILOL 25 MG/1
25 TABLET ORAL 2 TIMES DAILY WITH MEALS
Qty: 60 TAB | Refills: 10 | Status: SHIPPED | OUTPATIENT
Start: 2018-11-29 | End: 2021-07-28

## 2018-11-29 RX ORDER — METHIMAZOLE 10 MG/1
20 TABLET ORAL DAILY
Qty: 60 TAB | Refills: 5 | Status: SHIPPED | OUTPATIENT
Start: 2018-11-29 | End: 2019-07-07 | Stop reason: SDUPTHER

## 2018-11-29 NOTE — PROGRESS NOTES
Chief Complaint   Patient presents with    New Patient    Thyroid Problem    Fatigue     History of Present Illness: Guy Childress is a 50 y.o. female presents for evaluation of hyperthyroidism. She was referred by Dr Iliana Ayala. Diagnosed 11/27/2018 with undetectable TSH    Symptoms started about 1 month ago. Main sx if sweating overnight and during the daytime. She has also been more fatigued. Sleep is 'terrible'. Having trouble staying asleep and falling asleep. She has been more nervious and jittery. Noting tremulousness of hands. She has been more irritable. Also has muscle weakness and soreness in legs. Having trouble having bowel movements. Having more frequent, small BMs.  + palpitations. No menses for last 2 months. Family history: She reports her mother had hyperthyroidism and was treated with radioactive iodine. She describes her mother having Graves' related eye disease as well. She has several cousins with autoimmune thyroid disease as well. Social:  Works in manufacturing - active. . Past Medical History:   Diagnosis Date    Arthritis     Back     Depression     GERD (gastroesophageal reflux disease)     Hypertension     Hyperthyroidism     Insomnia     Pre-diabetes      Past Surgical History:   Procedure Laterality Date    HX CERVICAL FUSION      July 12 2018.  HX COLONOSCOPY  12/2014    HX DILATION AND CURETTAGE      laparoscopy    HX ENDOSCOPY  12/2014    ulcer     Current Outpatient Medications   Medication Sig    baclofen (LIORESAL) 10 mg tablet TK 1 T PO TID PRN    methocarbamol (ROBAXIN) 750 mg tablet Take 750 mg by mouth daily as needed.  gabapentin (NEURONTIN) 600 mg tablet Take 1 Tab by mouth two (2) times a day. (Patient taking differently: Take 1,200 mg by mouth two (2) times a day.)    amitriptyline (ELAVIL) 100 mg tablet Take 200 mg by mouth nightly.  clonazePAM (KLONOPIN) 1 mg tablet Take 1 mg by mouth as needed.     ALPRAZolam (XANAX) 1 mg tablet Take 1 mg by mouth three (3) times daily as needed for Anxiety.  QUEtiapine (SEROQUEL) 50 mg tablet Take 50 mg by mouth nightly.  pantoprazole (PROTONIX) 20 mg tablet Take 20 mg by mouth daily as needed.        No current facility-administered medications for this visit. No Known Allergies  Family History   Problem Relation Age of Onset   [address] Cancer Mother         Lung    Hypertension Mother     Thyroid Disease Mother         Hyperthyroidism    Cancer Father         stomach    Hypertension Sister     Diabetes Maternal Grandmother     Diabetes Paternal Grandmother      Social History     Socioeconomic History    Marital status: SINGLE     Spouse name: Not on file    Number of children: Not on file    Years of education: Not on file    Highest education level: Not on file   Social Needs    Financial resource strain: Not on file    Food insecurity - worry: Not on file    Food insecurity - inability: Not on file   [employer] needs - medical: Not on file   [employer] needs - non-medical: Not on file   Occupational History    Not on file   Tobacco Use    Smoking status: Former Smoker     Packs/day: 1.00     Years: 30.00     Pack years: 30.00     Types: Cigarettes     Last attempt to quit: 2018     Years since quittin.4    Smokeless tobacco: Never Used   Substance and Sexual Activity    Alcohol use: Yes     Alcohol/week: 1.8 oz     Types: 3 Shots of liquor per week     Comment: socially    Drug use: No    Sexual activity: Yes     Partners: Male   Other Topics Concern    Not on file   Social History Narrative    Not on file       Review of Systems:  - Constitutional Symptoms: no fevers, chills, weight loss  - Eyes: no blurry vision or double vision. No Graves related eye sx.   - Cardiovascular: see HPI  - Respiratory: no cough or shortness of breath  - Gastrointestinal: see HPI  - Musculoskeletal: see HPI  - Integumentary: no rashes  - Neurological: see HPI  - Psychiatric: see HPI  - Endocrine: see HPI    Physical Examination:  Visit Vitals  BP (!) 130/93 (BP 1 Location: Left arm, BP Patient Position: Sitting)   Pulse (!) 109   Resp 14   Ht 5' 4\" (1.626 m)   Wt 192 lb 3.2 oz (87.2 kg)   SpO2 100%   BMI 32.99 kg/m²   -   - General: pleasant, no distress,   - HEENT:No scleral/conjunctival injection, EOMI,  MMM  - Neck: supple, + right thyromegaly. Left lobe may be mildly enlarged. No other neck masses or lymph nodes, no supraclavicular or dorsocervical fat pads  - Cardiovascular: regular, tachycardic  - Respiratory: normal effort  - Integumentary:  no edema  - Neurological: alert, + tremor  - Psychiatric: normal, but somewhat anxious mood and affect    Data Reviewed:   TSH < 0.001. Normal CMP    Assessment/Plan:   1. Hyperthyroidism   With her family history, I suspect Graves' disease, however it is possible that this could be due to nodular thyroid disease as well considering her asymmetric gland on palpation  Check free T4 to obtain baseline level  Checking thyroid peroxidase antibodies TSH receptor antibody to evaluate for Hashimoto's thyroiditis and Graves' disease, respectively  For her significant symptoms, will start carvedilol 25 mg  Recommend starting with 1/2 tablet twice daily. Increase to 25 mg twice daily as tolerated  Start methimazole 20 mg daily. Reviewed how this works, expected time to effect, and potential side effects  Handout from American Thyroid Association on hyperthyroidism given and reviewed. Discussed treatment options, including radioactive iodine atherectomy. She may consider these, recommend starting methimazole as this is a reversible treatment and would likely get her thyroid levels down a little quicker  We will likely repeat labs in 1 month to evaluate her response and have her return in 2 months for labs and evaluation. 2. Hot flashes   May be due to hyperthyroidism or potentially menopause is well. 3. Amenorrhea   May be due to hyperthyroidism. We will also assess for menopause with LH, FSH and estradiol   Greater than 50% of 45 minute visit was spent counseling the patient about above. Patient Instructions   Hyperthyroidism  Suspect Graves disease    Check labs today for antibodies and thyroid hormone levels    Start methimazole 10mg  - 2 tablets daily  To lower thyroid hormone levels. May have you take more if levels are very high    Start carvedilol 25 mg tablet - 1/2 tablet twice daily for 1-2 days. Increase to whole tablet twice daily after 1-2 days of taking 1/2 tablet  Goal is for heart rate to be in 80s or lower resting  (heart rate in 50s-70s likely best for resting). Methimazole is a medication slows down your thyroid function and is used to treat hyperthyroidism. It starts working right away, but it typically takes 2-4 weeks for thyroid hormone levels to decrease significantly. Methimazole is a very safe and effective medication, however, there are a few things you should be aware of:   1. About 5% of patients who take methimazole can have an allergy to the medications. Please call us if you develop a new rash or if you have any new problems with your joints. 2.  Very rarely (1/500 patients) methimazole can cause the white blood cells to decrease. Please stop the medication and have your white blood cell evaluated if you have a fever and feel very sick. After stopping methimazole the white blood cell counts return to normal.          Follow-up Disposition:  Return in about 2 months (around 1/29/2019).     Copy sent to:

## 2018-11-29 NOTE — PROGRESS NOTES
Ricky Funes is a 50 y.o. female      Chief Complaint   Patient presents with    New Patient    Thyroid Problem    Fatigue         1. Have you been to the ER, urgent care clinic since your last visit? Hospitalized since your last visit? No    2. Have you seen or consulted any other health care providers outside of the [address] [place] [address] since your last visit? Include any pap smears or colon screening.  No

## 2018-11-29 NOTE — LETTER
NOTIFICATION RETURN TO WORK / SCHOOL 
 
11/29/2018 3:21 PM 
 
Ms. Nikhil Abraham [facility] [address] To Whom It May Concern: 
 
Nikhil Abraham is currently under the care of [facility]. She will return to work/school on: December 3rd. If there are questions or concerns please have the patient contact our office.  
 
 
 
Sincerely, 
 
 
Suzy Monge MD

## 2018-11-29 NOTE — PATIENT INSTRUCTIONS
Hyperthyroidism  Suspect Graves disease    Check labs today for antibodies and thyroid hormone levels    Start methimazole 10mg  - 2 tablets daily  To lower thyroid hormone levels. May have you take more if levels are very high    Start carvedilol 25 mg tablet - 1/2 tablet twice daily for 1-2 days. Increase to whole tablet twice daily after 1-2 days of taking 1/2 tablet  Goal is for heart rate to be in 80s or lower resting  (heart rate in 50s-70s likely best for resting). Methimazole is a medication slows down your thyroid function and is used to treat hyperthyroidism. It starts working right away, but it typically takes 2-4 weeks for thyroid hormone levels to decrease significantly. Methimazole is a very safe and effective medication, however, there are a few things you should be aware of:   1. About 5% of patients who take methimazole can have an allergy to the medications. Please call us if you develop a new rash or if you have any new problems with your joints. 2.  Very rarely (1/500 patients) methimazole can cause the white blood cells to decrease. Please stop the medication and have your white blood cell evaluated if you have a fever and feel very sick.  After stopping methimazole the white blood cell counts return to normal.

## 2018-11-30 LAB
ESTRADIOL SERPL-MCNC: 104.7 PG/ML
FSH SERPL-ACNC: 20.6 MIU/ML
LH SERPL-ACNC: 30.4 MIU/ML
SPECIMEN STATUS REPORT, ROLRST: NORMAL
T4 FREE SERPL-MCNC: 1.79 NG/DL (ref 0.82–1.77)
THYROPEROXIDASE AB SERPL-ACNC: 9 IU/ML (ref 0–34)
TSH RECEP AB SER-ACNC: 3.18 IU/L (ref 0–1.75)

## 2018-12-17 ENCOUNTER — OFFICE VISIT (OUTPATIENT)
Dept: ENDOCRINOLOGY | Age: 48
End: 2018-12-17

## 2018-12-17 VITALS
SYSTOLIC BLOOD PRESSURE: 129 MMHG | WEIGHT: 190.8 LBS | HEART RATE: 99 BPM | DIASTOLIC BLOOD PRESSURE: 82 MMHG | BODY MASS INDEX: 32.75 KG/M2

## 2018-12-17 DIAGNOSIS — E05.00 GRAVES DISEASE: Primary | ICD-10-CM

## 2018-12-17 RX ORDER — METHOCARBAMOL 750 MG/1
750 TABLET, FILM COATED ORAL
Refills: 0 | COMMUNITY
Start: 2018-10-23 | End: 2019-03-28

## 2018-12-17 NOTE — PROGRESS NOTES
History of Present Illness: Venkatesh Kc is a 50 y.o. female presents for follow-up of hyperthyroidism  She was referred by Dr Nereida Cardenas. Diagnosed 11/27/2018 with undetectable TSH. She was seen by me on 11/29/2018 and medications were started for hyperthyroidism and symptoms (methimazole and carvedilol, respectively)    Sleep and heat intolerance have improved. Shakiness/tremulousness have improved as well. Still having some muscle weakness in legs, etc.  Not having muscle spasms as much. Had her first menstrual cycle in several months since last visit as well. She was taking carvedilol twice daily consistently, but when sx improved she went to taking this prn. She is taking methimazole 20 mg daily consistently. She comes today to discuss [facility] paperwork. She missed work 11/27/2018-11/30/2018 (4 days). Returned to work on Monday 12/3/2018. This paperwork took a considerable amount of time to discuss and fill out. Family history: She reports her mother had hyperthyroidism and was treated with radioactive iodine. She describes her mother having Graves' related eye disease as well. She has several cousins with autoimmune thyroid disease as well. Social:  Works in manufacturing - active. . Past Medical History:   Diagnosis Date    Arthritis     Back     Depression     GERD (gastroesophageal reflux disease)     Hypertension     Hyperthyroidism     Insomnia     Pre-diabetes      Current Outpatient Medications   Medication Sig    carvedilol (COREG) 25 mg tablet Take 1 Tab by mouth two (2) times daily (with meals).  methIMAzole (TAPAZOLE) 10 mg tablet Take 2 Tabs by mouth daily.  gabapentin (NEURONTIN) 600 mg tablet Take 1 Tab by mouth two (2) times a day. (Patient taking differently: Take 1,200 mg by mouth two (2) times a day.)    amitriptyline (ELAVIL) 100 mg tablet Take 200 mg by mouth nightly.     clonazePAM (KLONOPIN) 1 mg tablet Take 1 mg by mouth as needed.  ALPRAZolam (XANAX) 1 mg tablet Take 1 mg by mouth three (3) times daily as needed for Anxiety.  QUEtiapine (SEROQUEL) 50 mg tablet Take 50 mg by mouth nightly.  methocarbamol (ROBAXIN) 750 mg tablet Take 750 mg by mouth daily as needed. No current facility-administered medications for this visit. No Known Allergies    Physical Examination:  Visit Vitals  /82 (BP 1 Location: Left arm, BP Patient Position: Sitting)   Pulse 99   Wt 190 lb 12.8 oz (86.5 kg)   BMI 32.75 kg/m²   -   - General: pleasant, no distress, normal gait   HEENT: hearing intact, EOMI, clear sclera without icterus  - Cardiovascular: regular, high-normal /increased rate   - Respiratory: normal effort  - Integumentary: no edema   Neuro: + mild fine tremor still present  - Psychiatric: normal mood and affect    Data Reviewed:   Component      Latest Ref Rng & Units 11/29/2018 11/29/2018 11/29/2018 11/29/2018           3:12 PM  3:12 PM  3:12 PM  3:12 PM   Thyrotropin Receptor Ab, serum      0.00 - 1.75 IU/L       Thyroid peroxidase Ab      0 - 34 IU/mL       T4, Free      0.82 - 1.77 ng/dL    1.79 (H)   FSH      mIU/mL   20.6    Luteinizing hormone      mIU/mL  30.4     Estradiol      pg/mL 104.7        Component      Latest Ref Rng & Units 11/29/2018 11/29/2018           3:12 PM  3:12 PM   Thyrotropin Receptor Ab, serum      0.00 - 1.75 IU/L  3.18 (H)   Thyroid peroxidase Ab      0 - 34 IU/mL 9        Assessment/Plan:   1. Graves disease   - repeat labs today  - doing better clinically  - recommend she continue carvedilol 12.5 mg twice daily  - Adjust methimazole based on labs. Continue 10 mg BID for now. Greater than 50% of 40 minute visit was spent counseling the patient about above, reviewing and discussing paperwork, tx, response to tx. Etc.      Patient Instructions   Graves disease    Continue carvedilol - 12.5 mg twice daily for at least another 7-14 days until symptoms further subside    Ideally your resting heart would be < 80. Labs today to assess response to methimazole 20 mg daily. May adjust dose based on labs. Follow-up Disposition:  Return in about 2 months (around 2/17/2019).     Copy sent to:

## 2018-12-17 NOTE — PATIENT INSTRUCTIONS
Graves disease    Continue carvedilol - 12.5 mg twice daily for at least another 7-14 days until symptoms further subside    Ideally your resting heart would be < 80. Labs today to assess response to methimazole 20 mg daily. May adjust dose based on labs.

## 2018-12-18 LAB
T4 FREE SERPL-MCNC: 1.32 NG/DL (ref 0.82–1.77)
TSH SERPL DL<=0.005 MIU/L-ACNC: <0.006 UIU/ML (ref 0.45–4.5)

## 2018-12-26 DIAGNOSIS — E05.90 HYPERTHYROIDISM: Primary | ICD-10-CM

## 2019-03-28 ENCOUNTER — OFFICE VISIT (OUTPATIENT)
Dept: ENDOCRINOLOGY | Age: 49
End: 2019-03-28

## 2019-03-28 VITALS
HEART RATE: 87 BPM | DIASTOLIC BLOOD PRESSURE: 100 MMHG | WEIGHT: 179 LBS | HEIGHT: 64 IN | BODY MASS INDEX: 30.56 KG/M2 | SYSTOLIC BLOOD PRESSURE: 153 MMHG

## 2019-03-28 DIAGNOSIS — E05.00 GRAVES DISEASE: Primary | ICD-10-CM

## 2019-03-28 RX ORDER — VARENICLINE TARTRATE 1 MG/1
TABLET, FILM COATED ORAL
COMMUNITY
Start: 2019-03-23 | End: 2021-07-28

## 2019-03-28 NOTE — PATIENT INSTRUCTIONS
Graves disease    Continue carvedilol 25 mg twice daily     Ideally your resting heart would be < 80.     Reassess on 20 mg    Will look at levels and at antibody levels    Can consider radioactive iodine OR surgery.     If you are thinking about radioactive iodine - would do imaging test first.

## 2019-03-28 NOTE — PROGRESS NOTES
History of Present Illness: Samantha Arzola is a 50 y.o. female presents for follow-up of hyperthyroidism  She was referred by Dr Terence Clayton. Diagnosed 11/27/2018 with undetectable TSH. She was seen by me on 11/29/2018 and medications were started for hyperthyroidism and symptoms (methimazole and carvedilol, respectively)    She had lowered methiimazole from 20 mg to 10 mg, but started feeling worse about 1 month ago  She has resumed taking 20 mg dose again for much of the last 4 weeks    Taking Carvedilol 25 mg twice daily. Started having heat intolerance and muscle weakness again. Sleep is poor. Family history: She reports her mother had hyperthyroidism and was treated with radioactive iodine. She describes her mother having Graves' related eye disease as well. She has several cousins with autoimmune thyroid disease as well. Social:  Works in [employer] - active. Former smoker. Past Medical History:   Diagnosis Date    Arthritis     Back     Depression     GERD (gastroesophageal reflux disease)     Hypertension     Hyperthyroidism     Insomnia     Pre-diabetes      Current Outpatient Medications   Medication Sig    CHANTIX 1 mg tablet     carvedilol (COREG) 25 mg tablet Take 1 Tab by mouth two (2) times daily (with meals).  methIMAzole (TAPAZOLE) 10 mg tablet Take 2 Tabs by mouth daily.  gabapentin (NEURONTIN) 600 mg tablet Take 1 Tab by mouth two (2) times a day. (Patient taking differently: Take 1,200 mg by mouth two (2) times a day.)    amitriptyline (ELAVIL) 100 mg tablet Take 200 mg by mouth nightly.  clonazePAM (KLONOPIN) 1 mg tablet Take 1 mg by mouth as needed.  ALPRAZolam (XANAX) 1 mg tablet Take 1 mg by mouth three (3) times daily as needed for Anxiety.  QUEtiapine (SEROQUEL) 50 mg tablet Take 50 mg by mouth nightly. No current facility-administered medications for this visit.       No Known Allergies    Review of Systems:  - Eyes: no blurry vision or double vision  - Cardiovascular: no chest pain  - Respiratory: no shortness of breath  - Musculoskeletal: See HPI    Physical Examination:  Visit Vitals  BP (!) 153/100 (BP 1 Location: Right arm, BP Patient Position: Sitting)   Pulse 87   Ht 5' 4\" (1.626 m)   Wt 179 lb (81.2 kg)   BMI 30.73 kg/m²   -   - General: pleasant, no distress, normal gait   HEENT: hearing intact, EOMI, clear sclera without icterus  - Neck: no appreciable thyromegaly or LAD  - Cardiovascular: regular, normal rate   - Respiratory: normal effort  - Integumentary: no edema  - Psychiatric: normal mood and affect    Data Reviewed:   Lab Results   Component Value Date/Time    Hemoglobin A1c, External 5.7 11/27/2018      Lab Results   Component Value Date/Time    Sodium 137 02/14/2018 11:13 AM    Potassium 3.9 02/14/2018 11:13 AM    Creatinine 0.93 02/14/2018 11:13 AM    Creatinine, External 0.8 11/27/2018        Lab Results   Component Value Date/Time    LDL-C, External 95.0 11/27/2018      Lab Results   Component Value Date/Time    TSH <0.006 12/17/2018 09:44 AM    T4, Free 1.32 12/17/2018 09:44 AM    Thyrotropin Receptor Ab, serum 3.18 11/29/2018 03:12 PM    Thyroid peroxidase Ab 9 11/29/2018 03:12 PM        Assessment/Plan:   1. Graves disease   Uncertain control  Repeat on methimazole 20 mg daily. Adjust dose as indicated  Continue carvedilol 25 mg twice daily to help control symptoms. If she desires, she can try taking 1.5 tablets / 37.5 mg twice daily. We will repeat antibody levels as well  She wanted to consider only consider alternative treatments, such as radioactive iodine and surgery. We discussed these options in depth. She will give these further consideration. Greater than 50% of 25 minute visit was spent counseling the patient about above.     Patient Instructions   Graves disease    Continue carvedilol 25 mg twice daily     Ideally your resting heart would be < 80.     Reassess on 20 mg    Will look at levels and at antibody levels    Can consider radioactive iodine OR surgery. If you are thinking about radioactive iodine - would do imaging test first.         Follow-up and Dispositions    · Return in about 3 months (around 6/28/2019).            Copy sent to:

## 2019-03-29 LAB
SPECIMEN STATUS REPORT, ROLRST: NORMAL
T4 FREE SERPL-MCNC: 1.03 NG/DL (ref 0.82–1.77)
TSH RECEP AB SER-ACNC: 1.18 IU/L (ref 0–1.75)
TSH SERPL DL<=0.005 MIU/L-ACNC: 2.41 UIU/ML (ref 0.45–4.5)

## 2019-03-31 DIAGNOSIS — E05.00 GRAVES DISEASE: Primary | ICD-10-CM

## 2019-03-31 NOTE — PROGRESS NOTES
Graves disease antibodies are improved. TSH has increased and Free T4 is low normal.    Will recommend 15 mg x 2 weeks then 10 mg x 2 weeks, then repeat labs.

## 2019-05-17 ENCOUNTER — OFFICE VISIT (OUTPATIENT)
Dept: ENDOCRINOLOGY | Age: 49
End: 2019-05-17

## 2019-05-17 VITALS
BODY MASS INDEX: 30.25 KG/M2 | DIASTOLIC BLOOD PRESSURE: 81 MMHG | HEIGHT: 64 IN | HEART RATE: 85 BPM | SYSTOLIC BLOOD PRESSURE: 132 MMHG | WEIGHT: 177.2 LBS

## 2019-05-17 DIAGNOSIS — E05.00 GRAVES DISEASE: Primary | ICD-10-CM

## 2019-05-17 RX ORDER — LIDOCAINE 50 MG/G
PATCH TOPICAL
Refills: 0 | COMMUNITY
Start: 2019-03-29 | End: 2021-07-28

## 2019-05-17 NOTE — PROGRESS NOTES
History of Present Illness: Lalita Ríos is a 50 y.o. female presents for follow-up of hyperthyroidism  She was referred by Dr Prabhu Chandra. Diagnosed 11/27/2018 with undetectable TSH. She was seen by me on 11/29/2018 and medications were started for hyperthyroidism and symptoms (methimazole and carvedilol, respectively)    She is currently taking 10 mg. No heat intolerance. Does have some stress at work, but feels she is handling anxiety better  Sleep remains somewhat poor, but overall better. Only takes carvedilol as needed for palpitations and perceived hyperthyroid symptoms. She is menopausal    Family history: She reports her mother had hyperthyroidism and was treated with radioactive iodine. She describes her mother having Graves' related eye disease as well. She has several cousins with autoimmune thyroid disease as well. Social:  Works in [employer] - active. Past Medical History:   Diagnosis Date    Arthritis     Back     Depression     GERD (gastroesophageal reflux disease)     Hypertension     Hyperthyroidism     Insomnia     Pre-diabetes      Current Outpatient Medications   Medication Sig    lidocaine (LIDODERM) 5 % ROXANA 1 PATCH TO PAINFUL AREA  PRN 12 H ON 12 H OFF    BACLOFEN PO Take  by mouth.  carvedilol (COREG) 25 mg tablet Take 1 Tab by mouth two (2) times daily (with meals).  methIMAzole (TAPAZOLE) 10 mg tablet Take 2 Tabs by mouth daily.  gabapentin (NEURONTIN) 600 mg tablet Take 1 Tab by mouth two (2) times a day. (Patient taking differently: Take 1,200 mg by mouth two (2) times a day.)    amitriptyline (ELAVIL) 100 mg tablet Take 200 mg by mouth nightly.  clonazePAM (KLONOPIN) 1 mg tablet Take 1 mg by mouth as needed.  ALPRAZolam (XANAX) 1 mg tablet Take 1 mg by mouth three (3) times daily as needed for Anxiety.  QUEtiapine (SEROQUEL) 50 mg tablet Take 50 mg by mouth nightly.     CHANTIX 1 mg tablet      No current facility-administered medications for this visit. No Known Allergies    Review of Systems:  - Eyes: no blurry vision or double vision  - Cardiovascular: no chest pain. See HPI  - Respiratory: no shortness of breath  - Musculoskeletal: no myalgias    Physical Examination:  Visit Vitals  /81 (BP 1 Location: Left arm, BP Patient Position: Sitting)   Pulse 85   Ht 5' 4\" (1.626 m)   Wt 177 lb 3.2 oz (80.4 kg)   BMI 30.42 kg/m²   -   - General: pleasant, no distress, normal gait   HEENT: hearing intact, EOMI, clear sclera without icterus  - Neck: no appreciable thyromegaly or LAD  - Cardiovascular: regular, normal rate   - Respiratory: normal effort  - Integumentary: no edema  - Psychiatric: normal mood and affect    Data Reviewed:   Lab Results   Component Value Date/Time    Hemoglobin A1c, External 5.7 11/27/2018      Lab Results   Component Value Date/Time    Sodium 137 02/14/2018 11:13 AM    Potassium 3.9 02/14/2018 11:13 AM    Creatinine 0.93 02/14/2018 11:13 AM    Creatinine, External 0.8 11/27/2018        Lab Results   Component Value Date/Time    LDL-C, External 95.0 11/27/2018      Lab Results   Component Value Date/Time    TSH 2.410 03/28/2019 10:45 AM    T4, Free 1.03 03/28/2019 10:45 AM    Thyrotropin Receptor Ab, serum 1.18 03/28/2019 10:45 AM    Thyroid peroxidase Ab 9 11/29/2018 03:12 PM        Assessment/Plan:   1. Graves disease   Labs on 10 mg. Following ab levels. Patient Instructions   Graves disease    Labs on 10 mg methimazole  Adjust dose as needed    Will follow Graves antibody levels        Follow-up and Dispositions    · Return in about 4 months (around 9/17/2019).            Copy sent to:

## 2019-05-17 NOTE — PATIENT INSTRUCTIONS
Graves disease    Labs on 10 mg methimazole  Adjust dose as needed    Will follow Graves antibody levels

## 2019-05-18 LAB
T4 FREE SERPL-MCNC: 0.97 NG/DL (ref 0.82–1.77)
TSH RECEP AB SER-ACNC: 1.11 IU/L (ref 0–1.75)
TSH SERPL DL<=0.005 MIU/L-ACNC: 1.76 UIU/ML (ref 0.45–4.5)

## 2019-08-21 ENCOUNTER — TELEPHONE (OUTPATIENT)
Dept: ENDOCRINOLOGY | Age: 49
End: 2019-08-21

## 2019-08-21 NOTE — TELEPHONE ENCOUNTER
Tried to call pt to remind her of her appt this Friday as were as to remind her to complete her lab work, but I was unable to reach her by phone and I was unable to leave a voicemail due to her voicemail being full.

## 2020-07-01 ENCOUNTER — ED HISTORICAL/CONVERTED ENCOUNTER (OUTPATIENT)
Dept: OTHER | Age: 50
End: 2020-07-01

## 2020-07-07 RX ORDER — METHIMAZOLE 10 MG/1
TABLET ORAL
Qty: 90 TAB | Refills: 0 | Status: SHIPPED | OUTPATIENT
Start: 2020-07-07 | End: 2020-11-20 | Stop reason: SDUPTHER

## 2020-10-10 RX ORDER — METHIMAZOLE 10 MG/1
TABLET ORAL
Qty: 90 TAB | Refills: 0 | OUTPATIENT
Start: 2020-10-10

## 2020-10-20 ENCOUNTER — TELEPHONE (OUTPATIENT)
Dept: ENDOCRINOLOGY | Age: 50
End: 2020-10-20

## 2020-10-20 RX ORDER — CARVEDILOL 25 MG/1
TABLET ORAL
Qty: 60 TAB | Refills: 10 | OUTPATIENT
Start: 2020-10-20

## 2020-10-20 NOTE — TELEPHONE ENCOUNTER
Please call [facility] and have them stop sending refills for her carvedilol. she is no longer under Dr. Devante shabazz since he left our practice on 1/19/20 and she will need to contact her PCP or new endocrinologist for further refills of this medication.

## 2020-10-21 NOTE — TELEPHONE ENCOUNTER
[facility] Luing pharmacist was notified and voiced understanding of the message noted per Dr. Pablo Lal.

## 2020-11-12 ENCOUNTER — TELEPHONE (OUTPATIENT)
Dept: ENDOCRINOLOGY | Age: 50
End: 2020-11-12

## 2020-11-19 PROBLEM — G89.29 CHRONIC NECK AND BACK PAIN: Status: ACTIVE | Noted: 2020-11-19

## 2020-11-19 PROBLEM — M54.2 CHRONIC NECK AND BACK PAIN: Status: ACTIVE | Noted: 2020-11-19

## 2020-11-19 PROBLEM — M54.9 CHRONIC NECK AND BACK PAIN: Status: ACTIVE | Noted: 2020-11-19

## 2020-11-19 PROBLEM — R20.2 PARESTHESIA: Status: ACTIVE | Noted: 2020-11-19

## 2020-11-20 ENCOUNTER — OFFICE VISIT (OUTPATIENT)
Dept: ENDOCRINOLOGY | Age: 50
End: 2020-11-20
Payer: COMMERCIAL

## 2020-11-20 VITALS
WEIGHT: 185 LBS | RESPIRATION RATE: 16 BRPM | TEMPERATURE: 97.3 F | SYSTOLIC BLOOD PRESSURE: 124 MMHG | DIASTOLIC BLOOD PRESSURE: 76 MMHG | HEIGHT: 64 IN | BODY MASS INDEX: 31.58 KG/M2 | OXYGEN SATURATION: 94 % | HEART RATE: 81 BPM

## 2020-11-20 DIAGNOSIS — I10 ESSENTIAL HYPERTENSION: ICD-10-CM

## 2020-11-20 DIAGNOSIS — E05.90 HYPERTHYROIDISM: ICD-10-CM

## 2020-11-20 DIAGNOSIS — E05.00 GRAVES DISEASE: Primary | ICD-10-CM

## 2020-11-20 PROCEDURE — 99204 OFFICE O/P NEW MOD 45 MIN: CPT | Performed by: INTERNAL MEDICINE

## 2020-11-20 RX ORDER — ASCORBIC ACID 500 MG
TABLET ORAL
COMMUNITY
End: 2022-07-29

## 2020-11-20 RX ORDER — LANOLIN ALCOHOL/MO/W.PET/CERES
1000 CREAM (GRAM) TOPICAL DAILY
COMMUNITY
End: 2021-07-28

## 2020-11-20 RX ORDER — DIAZEPAM 10 MG/1
10 TABLET ORAL
COMMUNITY

## 2020-11-20 RX ORDER — METHIMAZOLE 10 MG/1
10 TABLET ORAL DAILY
Qty: 90 TAB | Refills: 3 | Status: SHIPPED | OUTPATIENT
Start: 2020-11-20 | End: 2020-12-09 | Stop reason: SDUPTHER

## 2020-11-20 NOTE — PATIENT INSTRUCTIONS
Potential side effects of methimazole are rash,low blood count and rarely liver inflammation. If you get high grade fever,bad sorethroat,or sores in the mouth ,please call the office for blood tests. If white blood count is low,the methimazole should be stopped and the counts will normalize in 7 to 10 days.       Labs soon , in 2 months and before next visit

## 2020-11-20 NOTE — PROGRESS NOTES
Amparo Ramos MD              Patient Information  Date:11/21/2020  Name : Gibson Krueger 48 y.o.     YOB: 1970         Referred by: KUN Moreno         Chief Complaint   Patient presents with   [facility] Patient     referred for Thyroid       History of present illness    Gibson Krueger is a 48 y.o. female  here for evaluation of hyperthyroidism. She was diagnosed with Graves' disease in 2018 November, seen Dr. Prakash Peralta  On methimazole  She is on methimazole 10 mg now. Last set of labs were a year ago  When she was initially diagnosed with Graves' disease she had all symptoms except for weight loss and diarrhea  No fever  No sore throat  No eye symptoms    She reports her mother had hyperthyroidism and was treated with radioactive iodine. She describes her mother having Graves' related eye disease as well. She has several cousins with autoimmune thyroid disease as well. No family history of thyroid cancer    Past Medical History:   Diagnosis Date    Arthritis     Back     Depression     GERD (gastroesophageal reflux disease)     Hypertension     Hyperthyroidism     Insomnia     Pre-diabetes        Current Outpatient Medications   Medication Sig    diazePAM (VALIUM) 5 mg tablet Take 5 mg by mouth two (2) times a day.  ascorbic acid, vitamin C, (Vitamin C) 500 mg tablet Take  by mouth.  elderberry fruit (ELDERBERRY PO) Take  by mouth.  cholecalciferol, vitamin D3, (VITAMIN D3 PO) Take  by mouth.  cyanocobalamin (Vitamin B-12) 1,000 mcg tablet Take 1,000 mcg by mouth daily.  gabapentin (NEURONTIN) 600 mg tablet Take 1 Tab by mouth two (2) times a day. (Patient taking differently: Take 600 mg by mouth three (3) times daily.)    amitriptyline (ELAVIL) 100 mg tablet Take 200 mg by mouth nightly.  clonazePAM (KLONOPIN) 1 mg tablet Take 1 mg by mouth two (2) times a day.     QUEtiapine (SEROQUEL) 50 mg tablet Take 50 mg by mouth nightly.  methIMAzole (TAPAZOLE) 10 mg tablet Take 1 Tab by mouth daily.  lidocaine (LIDODERM) 5 % ROXANA 1 PATCH TO PAINFUL AREA  PRN 12 H ON 12 H OFF    BACLOFEN PO Take  by mouth.  CHANTIX 1 mg tablet     carvedilol (COREG) 25 mg tablet Take 1 Tab by mouth two (2) times daily (with meals). (Patient taking differently: Take 25 mg by mouth as needed (for symptoms hyperthyroidism). )    ALPRAZolam (XANAX) 1 mg tablet Take 1 mg by mouth three (3) times daily as needed for Anxiety. No current facility-administered medications for this visit. Allergies   Allergen Reactions    Percocet [Oxycodone-Acetaminophen] Itching       Review of Systems:  - Constitutional Symptoms: no fevers, chills, no weight loss  - Eyes: no blurry vision or double vision  - Cardiovascular: no chest pain no palpitations  - Respiratory: no cough or shortness of breath  - Gastrointestinal: no dysphagia or abdominal pain  - Musculoskeletal: no joint pains or weakness  - Integumentary: no rashes  - Neurological: no numbness, tingling, or headaches  - Psychiatric: no depression or anxiety  - Endocrine: no heat or cold intolerance, no polyuria or polydipsia    Physical Examination:  Blood pressure 124/76, pulse 81, temperature 97.3 °F (36.3 °C), temperature source Oral, resp. rate 16, height 5' 4\" (1.626 m), weight 185 lb (83.9 kg), SpO2 94 %. Body mass index is 31.76 kg/m².   - General: pleasant, no distress, good eye contact  - HEENT: no exopthalmos, no periorbital edema, no scleral/conjunctival injection, EOMI, no lid lag or stare  - Neck: supple, no thyromegaly, nodules, lymph nodes,   - Cardiovascular: regular,  normal S1 and S2, no murmurs  - Respiratory: clear to auscultation bilaterally  - Gastrointestinal: soft, nontender, nondistended, BS +  - Musculoskeletal: no proximal muscle weakness in upper or lower extremities  - Integumentary: No tremors, no edema  - Neurological: alert and oriented   - Psychiatric: normal mood and affect  - Skin - normal turgor    Data Reviewed:       Lab Results   Component Value Date/Time    TSH 1.760 05/17/2019 02:57 PM    T4, Free 0.97 05/17/2019 02:57 PM        [] Reviewed labs    Assessment/Plan:     1. Graves disease    2. Hyperthyroidism    3. Essential hypertension        1. Graves' disease diagnosed in November 2018  On methimazole chronically  Labs soon  We will try to wean off  Thyroid receptor antibody was normal in 2019    No Graves' eye disease  Continue present therapy          Follow-up and Dispositions    · Return in about 4 months (around 3/20/2021) for labs before next visit and follow up. Thank you for allowing me to participate in the care of this patient. Armando Erickson MD      Patient/caregiver verbalized understanding. Voice-recognition software was used to generate this report, which may result in some phonetic-based errors in the grammar and contents. Even though attempts were made to correct all the mistakes, some may have been missed and remained in the body of the report.

## 2020-11-20 NOTE — PROGRESS NOTES
Tim Cornelius is a 48 y.o. female here for   Chief Complaint   Patient presents with    New Patient     referred for Thyroid       1. Have you been to the ER, urgent care clinic since your last visit? Hospitalized since your last visit? -n/a    2. Have you seen or consulted any other health care providers outside of the [address] [place] [address] since your last visit?   Include any pap smears or colon screening.-n/a    Order placed for pt per verbal order with read back from Dr. Lucas Wilkerson 11/20/20

## 2020-12-09 DIAGNOSIS — E05.90 HYPERTHYROIDISM: ICD-10-CM

## 2020-12-09 DIAGNOSIS — E05.00 GRAVES DISEASE: ICD-10-CM

## 2020-12-09 RX ORDER — METHIMAZOLE 10 MG/1
10 TABLET ORAL
Qty: 90 TAB | Refills: 3 | Status: SHIPPED | OUTPATIENT
Start: 2020-12-09 | End: 2021-01-04 | Stop reason: DRUGHIGH

## 2021-01-01 LAB
T4 FREE SERPL-MCNC: 0.8 NG/DL (ref 0.82–1.77)
TSH SERPL DL<=0.005 MIU/L-ACNC: 3.17 UIU/ML (ref 0.45–4.5)

## 2021-01-04 ENCOUNTER — TELEPHONE (OUTPATIENT)
Dept: ENDOCRINOLOGY | Age: 51
End: 2021-01-04

## 2021-01-04 DIAGNOSIS — E05.00 GRAVES DISEASE: Primary | ICD-10-CM

## 2021-01-04 DIAGNOSIS — E05.90 HYPERTHYROIDISM: ICD-10-CM

## 2021-01-04 RX ORDER — METHIMAZOLE 5 MG/1
5 TABLET ORAL DAILY
Qty: 90 TAB | Refills: 1 | Status: SHIPPED | OUTPATIENT
Start: 2021-01-04 | End: 2022-07-29

## 2021-01-04 NOTE — TELEPHONE ENCOUNTER
Attempted to call. Unsuccessful. Left msg for Mak Gilbert to give us a call back at the office. A callback number was left.

## 2021-01-04 NOTE — TELEPHONE ENCOUNTER
----- Message from Ramona Roque MD sent at 1/2/2021  9:28 PM EST -----  Decrease the dose of methimazole to 5 mg from 10 mg

## 2021-01-04 NOTE — TELEPHONE ENCOUNTER
Per Dr. Pilar Garcia, informed pt of result note, as noted above. Pt verbalized understanding with no further questions or concerns at this time.

## 2021-01-04 NOTE — TELEPHONE ENCOUNTER
Attempted to call. Unsuccessful. Left msg for Miriam Chung to give us a call back at the office. A callback number was left.

## 2021-01-12 NOTE — TELEPHONE ENCOUNTER
She needs to call Dr Pebbles Nieto office as he has been managing her neck issues.   If she needs to take the Diclofenac twice daily for a short period of time that is fine but any other treatment needs to come from Dr Vince Ochoa lmm for parent to call and set up a pe appt    dw

## 2021-03-18 PROBLEM — E05.00 GRAVES DISEASE: Status: ACTIVE | Noted: 2021-03-18

## 2021-03-18 PROBLEM — E05.90 HYPERTHYROIDISM: Status: ACTIVE | Noted: 2021-03-18

## 2021-07-28 ENCOUNTER — APPOINTMENT (OUTPATIENT)
Dept: GENERAL RADIOLOGY | Age: 51
End: 2021-07-28
Attending: EMERGENCY MEDICINE
Payer: COMMERCIAL

## 2021-07-28 ENCOUNTER — HOSPITAL ENCOUNTER (EMERGENCY)
Age: 51
Discharge: HOME | End: 2021-07-29
Attending: EMERGENCY MEDICINE
Payer: COMMERCIAL

## 2021-07-28 DIAGNOSIS — I10 HYPERTENSION, UNSPECIFIED TYPE: Primary | ICD-10-CM

## 2021-07-28 DIAGNOSIS — R07.89 ATYPICAL CHEST PAIN: ICD-10-CM

## 2021-07-28 PROCEDURE — 93005 ELECTROCARDIOGRAM TRACING: CPT

## 2021-07-28 PROCEDURE — 99284 EMERGENCY DEPT VISIT MOD MDM: CPT

## 2021-07-28 PROCEDURE — 71045 X-RAY EXAM CHEST 1 VIEW: CPT

## 2021-07-28 RX ORDER — IBUPROFEN AND FAMOTIDINE 800; 26.6 MG/1; MG/1
TABLET, COATED ORAL
COMMUNITY
End: 2022-07-29

## 2021-07-29 VITALS
WEIGHT: 182 LBS | HEART RATE: 72 BPM | HEIGHT: 64 IN | OXYGEN SATURATION: 98 % | RESPIRATION RATE: 18 BRPM | BODY MASS INDEX: 31.07 KG/M2 | TEMPERATURE: 98.4 F | DIASTOLIC BLOOD PRESSURE: 95 MMHG | SYSTOLIC BLOOD PRESSURE: 155 MMHG

## 2021-07-29 LAB
ANION GAP SERPL CALC-SCNC: 9 MMOL/L (ref 5–15)
ATRIAL RATE: 76 BPM
BASOPHILS # BLD: 0 K/UL (ref 0–0.1)
BASOPHILS NFR BLD: 0 % (ref 0–1)
BUN SERPL-MCNC: 16 MG/DL (ref 6–20)
BUN/CREAT SERPL: 17 (ref 12–20)
CA-I BLD-MCNC: 9 MG/DL (ref 8.5–10.1)
CALCULATED P AXIS, ECG09: 65 DEGREES
CALCULATED R AXIS, ECG10: 13 DEGREES
CALCULATED T AXIS, ECG11: 39 DEGREES
CHLORIDE SERPL-SCNC: 103 MMOL/L (ref 97–108)
CO2 SERPL-SCNC: 29 MMOL/L (ref 21–32)
CREAT SERPL-MCNC: 0.96 MG/DL (ref 0.55–1.02)
DIAGNOSIS, 93000: NORMAL
DIFFERENTIAL METHOD BLD: NORMAL
EOSINOPHIL # BLD: 0.2 K/UL (ref 0–0.4)
EOSINOPHIL NFR BLD: 3 % (ref 0–7)
ERYTHROCYTE [DISTWIDTH] IN BLOOD BY AUTOMATED COUNT: 14.4 % (ref 11.5–14.5)
GLUCOSE SERPL-MCNC: 95 MG/DL (ref 65–100)
HCT VFR BLD AUTO: 37.6 % (ref 35–47)
HGB BLD-MCNC: 12.6 G/DL (ref 11.5–16)
IMM GRANULOCYTES # BLD AUTO: 0 K/UL (ref 0–0.04)
IMM GRANULOCYTES NFR BLD AUTO: 0 % (ref 0–0.5)
LYMPHOCYTES # BLD: 3.4 K/UL (ref 0.8–3.5)
LYMPHOCYTES NFR BLD: 46 % (ref 12–49)
MCH RBC QN AUTO: 31.3 PG (ref 26–34)
MCHC RBC AUTO-ENTMCNC: 33.5 G/DL (ref 30–36.5)
MCV RBC AUTO: 93.3 FL (ref 80–99)
MONOCYTES # BLD: 0.4 K/UL (ref 0–1)
MONOCYTES NFR BLD: 5 % (ref 5–13)
NEUTS SEG # BLD: 3.3 K/UL (ref 1.8–8)
NEUTS SEG NFR BLD: 46 % (ref 32–75)
P-R INTERVAL, ECG05: 154 MS
PLATELET # BLD AUTO: 245 K/UL (ref 150–400)
PMV BLD AUTO: 9.5 FL (ref 8.9–12.9)
POTASSIUM SERPL-SCNC: 3.3 MMOL/L (ref 3.5–5.1)
Q-T INTERVAL, ECG07: 390 MS
QRS DURATION, ECG06: 84 MS
QTC CALCULATION (BEZET), ECG08: 438 MS
RBC # BLD AUTO: 4.03 M/UL (ref 3.8–5.2)
SODIUM SERPL-SCNC: 141 MMOL/L (ref 136–145)
TROPONIN I SERPL-MCNC: <0.05 NG/ML
VENTRICULAR RATE, ECG03: 76 BPM
WBC # BLD AUTO: 7.3 K/UL (ref 3.6–11)

## 2021-07-29 PROCEDURE — 85025 COMPLETE CBC W/AUTO DIFF WBC: CPT

## 2021-07-29 PROCEDURE — 74011250637 HC RX REV CODE- 250/637: Performed by: EMERGENCY MEDICINE

## 2021-07-29 PROCEDURE — 80048 BASIC METABOLIC PNL TOTAL CA: CPT

## 2021-07-29 PROCEDURE — 36415 COLL VENOUS BLD VENIPUNCTURE: CPT

## 2021-07-29 PROCEDURE — 84484 ASSAY OF TROPONIN QUANT: CPT

## 2021-07-29 RX ORDER — CLONIDINE HYDROCHLORIDE 0.2 MG/1
0.2 TABLET ORAL 2 TIMES DAILY
Qty: 60 TABLET | Refills: 0 | Status: SHIPPED | OUTPATIENT
Start: 2021-07-29 | End: 2021-08-28

## 2021-07-29 RX ORDER — ACETAMINOPHEN 500 MG
1000 TABLET ORAL ONCE
Status: COMPLETED | OUTPATIENT
Start: 2021-07-29 | End: 2021-07-29

## 2021-07-29 RX ORDER — CLONIDINE HYDROCHLORIDE 0.1 MG/1
0.2 TABLET ORAL
Status: COMPLETED | OUTPATIENT
Start: 2021-07-29 | End: 2021-07-29

## 2021-07-29 RX ADMIN — ACETAMINOPHEN 1000 MG: 500 TABLET ORAL at 01:04

## 2021-07-29 RX ADMIN — CLONIDINE HYDROCHLORIDE 0.2 MG: 0.1 TABLET ORAL at 01:04

## 2021-07-29 NOTE — ED PROVIDER NOTES
EMERGENCY DEPARTMENT HISTORY AND PHYSICAL EXAM      Date: 7/28/2021  Patient Name: Narayan Lopez    History of Presenting Illness     Chief Complaint   Patient presents with    Headache    Hypertension    Chest Pain       History Provided By: Patient    HPI: Narayan Lopez, 48 y.o. female   presents to the ED with cc of hypertension. Patient with a history of hypertension came to emergency room complaining of elevated blood pressure reading at home. Patient states that she used to take blood pressure medication several years ago but was taken off by her PMD because the blood pressure improved as per patient. Patient also complains of intermittent episode of sharp stabbing generalized chest pain the last few seconds at a time for last several month. The last episode was several hours prior to arrival.  Patient also complains of mild throbbing generalized headache. No head injury. No visual changes. No slurred speech. No motor dysfunction. No fever chills. PCP: KUN Foster    No current facility-administered medications on file prior to encounter. Current Outpatient Medications on File Prior to Encounter   Medication Sig Dispense Refill    methIMAzole (TAPAZOLE) 5 mg tablet Take 1 Tab by mouth daily. Stop 10 mg 90 Tab 1    diazePAM (VALIUM) 5 mg tablet Take 5 mg by mouth every twelve (12) hours as needed.  ascorbic acid, vitamin C, (Vitamin C) 500 mg tablet Take  by mouth.  gabapentin (NEURONTIN) 600 mg tablet Take 1 Tab by mouth two (2) times a day. (Patient taking differently: Take 300 mg by mouth two (2) times a day.) 1 Tab 1    amitriptyline (ELAVIL) 100 mg tablet Take 200 mg by mouth nightly.  QUEtiapine (SEROQUEL) 50 mg tablet Take 50 mg by mouth nightly.  ibuprofen-famotidine (Duexis) 800-26.6 mg tab Duexis 800 mg-26.6 mg tablet   Take 1 tablet 3 times a day by oral route as directed for 30 days.          Past History     Past Medical History:  Past Medical History:   Diagnosis Date    Arthritis     Back     Depression     GERD (gastroesophageal reflux disease)     Hypertension     Hyperthyroidism     Insomnia     Pre-diabetes        Past Surgical History:  Past Surgical History:   Procedure Laterality Date    HX CERVICAL FUSION      July 12 2018.  HX COLONOSCOPY  12/2014    HX DILATION AND CURETTAGE      laparoscopy    HX ENDOSCOPY  12/2014    ulcer       Family History:  Family History   Problem Relation Age of Onset    Cancer Mother         Lung    Hypertension Mother     Thyroid Disease Mother         Hyperthyroidism    Cancer Father         stomach    Hypertension Sister     Diabetes Maternal Grandmother     Diabetes Paternal Grandmother        Social History:  Social History     Tobacco Use    Smoking status: Current Every Day Smoker     Packs/day: 1.00     Years: 30.00     Pack years: 30.00     Types: Cigarettes     Last attempt to quit: 6/18/2018     Years since quitting: 3.1    Smokeless tobacco: Never Used   Substance Use Topics    Alcohol use: Yes     Alcohol/week: 3.0 standard drinks     Types: 3 Shots of liquor per week     Comment: socially    Drug use: No       Allergies: Allergies   Allergen Reactions    Percocet [Oxycodone-Acetaminophen] Itching         Review of Systems   Review of Systems   Constitutional: Negative for activity change, appetite change, chills and fever. HENT: Negative for sore throat. Eyes: Negative for discharge. Respiratory: Negative for shortness of breath. Cardiovascular: Positive for chest pain. Gastrointestinal: Negative for nausea. Endocrine: Negative for polyuria. Genitourinary: Negative for difficulty urinating. Musculoskeletal: Negative for arthralgias. Skin: Negative for rash. Neurological: Negative for headaches. Hematological: Negative for adenopathy. Psychiatric/Behavioral: Negative for suicidal ideas. All other systems reviewed and are negative.       Physical Exam Physical Exam  Vitals and nursing note reviewed. Constitutional:       Appearance: Normal appearance. HENT:      Head: Normocephalic and atraumatic. Nose: Nose normal.      Mouth/Throat:      Mouth: Mucous membranes are moist.      Pharynx: Oropharynx is clear. Eyes:      Extraocular Movements: Extraocular movements intact. Conjunctiva/sclera: Conjunctivae normal.      Pupils: Pupils are equal, round, and reactive to light. Cardiovascular:      Rate and Rhythm: Normal rate and regular rhythm. Heart sounds: Normal heart sounds. Comments: Chest wall mildly tender to reproduce the pain  Pulmonary:      Effort: Pulmonary effort is normal.      Breath sounds: Normal breath sounds. Abdominal:      General: Abdomen is flat. Bowel sounds are normal.      Palpations: Abdomen is soft. Musculoskeletal:      Cervical back: Neck supple. Right lower leg: No edema. Left lower leg: No edema. Skin:     General: Skin is warm and dry. Neurological:      General: No focal deficit present. Mental Status: She is alert and oriented to person, place, and time. Cranial Nerves: No cranial nerve deficit. Motor: No weakness. Gait: Gait normal.   Psychiatric:         Mood and Affect: Mood normal.         Diagnostic Study Results     Labs -     Recent Results (from the past 12 hour(s))   CBC WITH AUTOMATED DIFF    Collection Time: 07/29/21 12:00 AM   Result Value Ref Range    WBC 7.3 3.6 - 11.0 K/uL    RBC 4.03 3.80 - 5.20 M/uL    HGB 12.6 11.5 - 16.0 g/dL    HCT 37.6 35.0 - 47.0 %    MCV 93.3 80.0 - 99.0 FL    MCH 31.3 26.0 - 34.0 PG    MCHC 33.5 30.0 - 36.5 g/dL    RDW 14.4 11.5 - 14.5 %    PLATELET 975 324 - 406 K/uL    MPV 9.5 8.9 - 12.9 FL    NEUTROPHILS 46 32 - 75 %    LYMPHOCYTES 46 12 - 49 %    MONOCYTES 5 5 - 13 %    EOSINOPHILS 3 0 - 7 %    BASOPHILS 0 0 - 1 %    IMMATURE GRANULOCYTES 0 0.0 - 0.5 %    ABS. NEUTROPHILS 3.3 1.8 - 8.0 K/UL    ABS.  LYMPHOCYTES 3.4 0.8 - 3.5 K/UL    ABS. MONOCYTES 0.4 0.0 - 1.0 K/UL    ABS. EOSINOPHILS 0.2 0.0 - 0.4 K/UL    ABS. BASOPHILS 0.0 0.0 - 0.1 K/UL    ABS. IMM. GRANS. 0.0 0.00 - 0.04 K/UL    DF AUTOMATED     METABOLIC PANEL, BASIC    Collection Time: 07/29/21 12:00 AM   Result Value Ref Range    Sodium 141 136 - 145 mmol/L    Potassium 3.3 (L) 3.5 - 5.1 mmol/L    Chloride 103 97 - 108 mmol/L    CO2 29 21 - 32 mmol/L    Anion gap 9 5 - 15 mmol/L    Glucose 95 65 - 100 mg/dL    BUN 16 6 - 20 mg/dL    Creatinine 0.96 0.55 - 1.02 mg/dL    BUN/Creatinine ratio 17 12 - 20      GFR est AA >60 >60 ml/min/1.73m2    GFR est non-AA >60 >60 ml/min/1.73m2    Calcium 9.0 8.5 - 10.1 mg/dL   TROPONIN I    Collection Time: 07/29/21 12:00 AM   Result Value Ref Range    Troponin-I, Qt. <0.05 <0.05 ng/mL       Radiologic Studies -   XR CHEST PORT   Final Result      No acute cardiopulmonary process. Status post ACDF        CT Results  (Last 48 hours)    None        CXR Results  (Last 48 hours)               07/28/21 2349  XR CHEST PORT Final result    Impression:      No acute cardiopulmonary process. Status post ACDF       Narrative:      Findings:       A single portable AP view of the chest was obtained at 23:45 and compared with   prior study 12/22/2017       Cardiac caliber within normal limits. Lungs adequately ventilated. No edema,   effusion or consolidation                   Medical Decision Making   I am the first provider for this patient. I reviewed the vital signs, available nursing notes, past medical history, past surgical history, family history and social history. Vital Signs-Reviewed the patient's vital signs. Patient Vitals for the past 12 hrs:   Temp Pulse Resp BP SpO2   07/29/21 0019 -- 74 18 (!) 162/97 --   07/28/21 2336 98.4 °F (36.9 °C) 76 18 (!) 158/107 98 %       Records Reviewed:     Provider Notes (Medical Decision Making):       ED Course:   Initial assessment performed.  The patients presenting problems have been discussed, and they are in agreement with the care plan formulated and outlined with them. I have encouraged them to ask questions as they arise throughout their visit. No chest pain. No shortness of breath. No apparent distress. Neuro nonfocal.      PROCEDURES      Disposition: Condition stable   DC- Adult Discharges: All of the diagnostic tests were reviewed and questions answered. Diagnosis, care plan and treatment options were discussed. understand instructions and will follow up as directed. The patients results have been reviewed with them. They have been counseled regarding their diagnosis. The patient verbally convey understanding and agreement of the signs, symptoms, diagnosis, treatment and prognosis and additionally agrees to follow up as recommended. They also agree with the care-plan and convey that all of their questions have been answered. I have also put together some discharge instructions for them that include: 1) educational information regarding their diagnosis, 2) how to care for their diagnosis at home, as well a 3) list of reasons why they would want to return to the ED prior to their follow-up appointment, should their condition change. PLAN:  1. Current Discharge Medication List      START taking these medications    Details   cloNIDine HCL (CATAPRES) 0.2 mg tablet Take 1 Tablet by mouth two (2) times a day for 30 days. Qty: 60 Tablet, Refills: 0  Start date: 7/29/2021, End date: 8/28/2021           2. Follow-up Information     Follow up With Specialties Details Why Contact Info    Follow up with your primary care physician  Schedule an appointment as soon as possible for a visit in 3 days As needed         Return to ED if worse     Diagnosis     Clinical Impression:   1. Hypertension, unspecified type    2. Atypical chest pain        Please note that this dictation was completed with [email or web address], the [employer] voice recognition software.   Quite often unanticipated grammatical, syntax, homophones, and other interpretive errors are inadvertently transcribed by the computer software. Please disregard these errors. Please excuse any errors that have escaped final proofreading. Thank you.

## 2021-09-07 ENCOUNTER — TRANSCRIBE ORDER (OUTPATIENT)
Dept: SCHEDULING | Age: 51
End: 2021-09-07

## 2021-09-07 DIAGNOSIS — Z12.31 SCREENING MAMMOGRAM FOR HIGH-RISK PATIENT: Primary | ICD-10-CM

## 2022-03-18 PROBLEM — E05.90 HYPERTHYROIDISM: Status: ACTIVE | Noted: 2021-03-18

## 2022-03-18 PROBLEM — G89.29 CHRONIC NECK AND BACK PAIN: Status: ACTIVE | Noted: 2020-11-19

## 2022-03-18 PROBLEM — M54.2 CHRONIC NECK AND BACK PAIN: Status: ACTIVE | Noted: 2020-11-19

## 2022-03-18 PROBLEM — I10 ESSENTIAL HYPERTENSION: Status: ACTIVE | Noted: 2017-04-06

## 2022-03-18 PROBLEM — M54.9 CHRONIC NECK AND BACK PAIN: Status: ACTIVE | Noted: 2020-11-19

## 2022-03-19 PROBLEM — E78.2 MIXED HYPERLIPIDEMIA: Status: ACTIVE | Noted: 2017-03-23

## 2022-03-19 PROBLEM — R20.2 PARESTHESIA: Status: ACTIVE | Noted: 2020-11-19

## 2022-03-19 PROBLEM — E05.00 GRAVES DISEASE: Status: ACTIVE | Noted: 2021-03-18

## 2022-03-19 PROBLEM — M51.36 DDD (DEGENERATIVE DISC DISEASE), LUMBAR: Status: ACTIVE | Noted: 2017-12-19

## 2022-03-20 PROBLEM — M50.30 DDD (DEGENERATIVE DISC DISEASE), CERVICAL: Status: ACTIVE | Noted: 2017-12-19

## 2022-07-29 ENCOUNTER — HOSPITAL ENCOUNTER (OUTPATIENT)
Dept: PREADMISSION TESTING | Age: 52
Discharge: HOME | End: 2022-07-29
Payer: COMMERCIAL

## 2022-07-29 VITALS
RESPIRATION RATE: 16 BRPM | DIASTOLIC BLOOD PRESSURE: 75 MMHG | BODY MASS INDEX: 31.43 KG/M2 | WEIGHT: 184.1 LBS | TEMPERATURE: 97.5 F | OXYGEN SATURATION: 96 % | SYSTOLIC BLOOD PRESSURE: 163 MMHG | HEIGHT: 64 IN | HEART RATE: 95 BPM

## 2022-07-29 LAB
ANION GAP SERPL CALC-SCNC: 5 MMOL/L (ref 5–15)
BUN SERPL-MCNC: 11 MG/DL (ref 6–20)
BUN/CREAT SERPL: 13 (ref 12–20)
CALCIUM SERPL-MCNC: 9.6 MG/DL (ref 8.5–10.1)
CHLORIDE SERPL-SCNC: 104 MMOL/L (ref 97–108)
CO2 SERPL-SCNC: 31 MMOL/L (ref 21–32)
CREAT SERPL-MCNC: 0.86 MG/DL (ref 0.55–1.02)
GLUCOSE SERPL-MCNC: 108 MG/DL (ref 65–100)
POTASSIUM SERPL-SCNC: 4 MMOL/L (ref 3.5–5.1)
SODIUM SERPL-SCNC: 140 MMOL/L (ref 136–145)

## 2022-07-29 PROCEDURE — 80048 BASIC METABOLIC PNL TOTAL CA: CPT

## 2022-07-29 PROCEDURE — 36415 COLL VENOUS BLD VENIPUNCTURE: CPT

## 2022-07-29 PROCEDURE — 93005 ELECTROCARDIOGRAM TRACING: CPT

## 2022-07-29 RX ORDER — METHIMAZOLE 10 MG/1
10 TABLET ORAL EVERY MORNING
COMMUNITY

## 2022-07-29 RX ORDER — ERGOCALCIFEROL 1.25 MG/1
50000 CAPSULE ORAL
COMMUNITY

## 2022-07-29 RX ORDER — IBUPROFEN AND FAMOTIDINE 26.6; 8 MG/1; MG/1
1 TABLET, FILM COATED ORAL AS NEEDED
COMMUNITY

## 2022-07-29 RX ORDER — GABAPENTIN 300 MG/1
300 CAPSULE ORAL 2 TIMES DAILY
COMMUNITY

## 2022-07-29 RX ORDER — LOSARTAN POTASSIUM 100 MG/1
100 TABLET ORAL EVERY MORNING
COMMUNITY

## 2022-07-29 RX ORDER — AMITRIPTYLINE HYDROCHLORIDE 100 MG/1
100 TABLET, FILM COATED ORAL
COMMUNITY

## 2022-07-29 NOTE — PERIOP NOTES
[address] [facility] [address], [address] [facility]   MAIN OR                                  [phone]   MAIN PRE OP                          [phone]                                                                                AMBULATORY PRE OP          [phone]  PRE-ADMISSION TESTING    [phone]   Surgery Date:  Friday 8/5/22       Is surgery arrival time given by surgeon? NO  If NO, [address] staff will call you between 3 and 7pm the day before your surgery with your arrival time. (If your surgery is on a Monday, we will call you the Friday before.)    Call [phone] after 7pm Monday-Friday if you did not receive this call. INSTRUCTIONS BEFORE YOUR SURGERY   When You  Arrive Arrive at the 2nd [address] [facility] on the day of your surgery  Have your insurance card, photo ID, and any copayment (if needed)   Food   and   Drink NO food or drink after midnight the night before surgery    This means NO water, gum, mints, coffee, juice, etc.  No alcohol (beer, wine, liquor) 24 hours before and after surgery   Medications to   TAKE   Morning of Surgery MEDICATIONS TO TAKE THE MORNING OF SURGERY WITH A SIP OF WATER:   GABAPENTIN  METHIMAZOLE  DIAZEPAM IF NEEDED     Medications  To  STOP      7 days before surgery Non-Steroidal anti-inflammatory Drugs (NSAID's): for example, Ibuprofen (Advil, Motrin), Naproxen (Aleve)  Aspirin, if taking for pain   Herbal supplements, vitamins, and fish oil  Other:STOP BC POWDER 7 DAYS PRIOR TO SURGERY  (Pain medications not listed above, including Tylenol may be taken)   Blood  Thinners If you take  Aspirin, Plavix, Coumadin, or any blood-thinning or anti-blood clot medicine, talk to the doctor who prescribed the medications for pre-operative instructions.    Bathing Clothing  Jewelry  Valuables     If you shower the morning of surgery, please do not apply anything to your skin (lotions, powders, deodorant, or makeup, especially mascara)  Follow Chlorhexidine [employer] body wash instructions provided to you during PAT appointment. Begin 3 days prior to surgery. Do not shave or trim anywhere 24 hours before surgery  Wear your hair loose or down; no pony-tails, buns, or metal hair clips  Wear loose, comfortable, clean clothes  Wear glasses instead of contacts  Leave money, valuables, and jewelry, including body piercings, at home  If you were given an [employer], bring it on day of surgery. Going Home - or Spending the Night SAME-DAY SURGERY: You must have a responsible adult drive you home and stay with you 24 hours after surgery  ADMITS: If your doctor is keeping you in the hospital after surgery, leave personal belongings/luggage in your car until you have a hospital room number. Hospital discharge time is 12 noon  Drivers must be here before 12 noon unless you are told differently   Special Instructions NONE       Follow all instructions so your surgery wont be cancelled. Please, be on time. If a situation occurs and you are delayed the day of surgery, call [phone] or [phone]. If your physical condition changes (like a fever, cold, flu, etc.) call your surgeon. Home medication(s) reviewed and verified via   PHONE   LIST   VERBAL   during PAT appointment. The patient was contacted by   IN-PERSON  The patient verbalizes understanding of all instructions and DOES NOT   need reinforcement.

## 2022-07-31 LAB
ATRIAL RATE: 90 BPM
CALCULATED P AXIS, ECG09: 68 DEGREES
CALCULATED R AXIS, ECG10: 10 DEGREES
CALCULATED T AXIS, ECG11: 24 DEGREES
DIAGNOSIS, 93000: NORMAL
P-R INTERVAL, ECG05: 134 MS
Q-T INTERVAL, ECG07: 354 MS
QRS DURATION, ECG06: 78 MS
QTC CALCULATION (BEZET), ECG08: 433 MS
VENTRICULAR RATE, ECG03: 90 BPM

## 2022-08-04 ENCOUNTER — ANESTHESIA EVENT (OUTPATIENT)
Dept: SURGERY | Age: 52
End: 2022-08-04
Payer: COMMERCIAL

## 2022-08-05 ENCOUNTER — ANESTHESIA (OUTPATIENT)
Dept: SURGERY | Age: 52
End: 2022-08-05
Payer: COMMERCIAL

## 2022-08-05 ENCOUNTER — HOSPITAL ENCOUNTER (OUTPATIENT)
Age: 52
Setting detail: OUTPATIENT SURGERY
Discharge: HOME | End: 2022-08-05
Attending: ORTHOPAEDIC SURGERY | Admitting: ORTHOPAEDIC SURGERY
Payer: COMMERCIAL

## 2022-08-05 VITALS
SYSTOLIC BLOOD PRESSURE: 124 MMHG | RESPIRATION RATE: 15 BRPM | HEIGHT: 64 IN | OXYGEN SATURATION: 95 % | WEIGHT: 175.71 LBS | BODY MASS INDEX: 30 KG/M2 | DIASTOLIC BLOOD PRESSURE: 71 MMHG | TEMPERATURE: 97.7 F | HEART RATE: 92 BPM

## 2022-08-05 PROCEDURE — 77030000032 HC CUF TRNQT ZIMM -B: Performed by: ORTHOPAEDIC SURGERY

## 2022-08-05 PROCEDURE — 74011000250 HC RX REV CODE- 250: Performed by: NURSE ANESTHETIST, CERTIFIED REGISTERED

## 2022-08-05 PROCEDURE — 74011250636 HC RX REV CODE- 250/636: Performed by: NURSE ANESTHETIST, CERTIFIED REGISTERED

## 2022-08-05 PROCEDURE — 77030040361 HC SLV COMPR DVT MDII -B

## 2022-08-05 PROCEDURE — 76060000061 HC AMB SURG ANES 0.5 TO 1 HR: Performed by: ORTHOPAEDIC SURGERY

## 2022-08-05 PROCEDURE — 74011250636 HC RX REV CODE- 250/636: Performed by: ORTHOPAEDIC SURGERY

## 2022-08-05 PROCEDURE — 76030000000 HC AMB SURG OR TIME 0.5 TO 1: Performed by: ORTHOPAEDIC SURGERY

## 2022-08-05 PROCEDURE — 77030040922 HC BLNKT HYPOTHRM STRY -A

## 2022-08-05 PROCEDURE — 2709999900 HC NON-CHARGEABLE SUPPLY: Performed by: ORTHOPAEDIC SURGERY

## 2022-08-05 PROCEDURE — 77030006884 HC BLD SHV INCIS S&N -B: Performed by: ORTHOPAEDIC SURGERY

## 2022-08-05 PROCEDURE — 74011000250 HC RX REV CODE- 250: Performed by: ORTHOPAEDIC SURGERY

## 2022-08-05 PROCEDURE — 76210000046 HC AMBSU PH II REC FIRST 0.5 HR: Performed by: ORTHOPAEDIC SURGERY

## 2022-08-05 PROCEDURE — 77030020143 HC AIRWY LARYN INTUB CGAS -A: Performed by: ANESTHESIOLOGY

## 2022-08-05 PROCEDURE — 77030008495 HC TBNG ARTHSC IRR CNMD -B: Performed by: ORTHOPAEDIC SURGERY

## 2022-08-05 PROCEDURE — 76210000034 HC AMBSU PH I REC 0.5 TO 1 HR: Performed by: ORTHOPAEDIC SURGERY

## 2022-08-05 PROCEDURE — 74011250636 HC RX REV CODE- 250/636: Performed by: ANESTHESIOLOGY

## 2022-08-05 PROCEDURE — 77030018834: Performed by: ORTHOPAEDIC SURGERY

## 2022-08-05 RX ORDER — EPHEDRINE SULFATE/0.9% NACL/PF 50 MG/5 ML
SYRINGE (ML) INTRAVENOUS AS NEEDED
Status: DISCONTINUED | OUTPATIENT
Start: 2022-08-05 | End: 2022-08-05 | Stop reason: HOSPADM

## 2022-08-05 RX ORDER — OXYCODONE HYDROCHLORIDE 5 MG/1
5 TABLET ORAL AS NEEDED
Status: DISCONTINUED | OUTPATIENT
Start: 2022-08-05 | End: 2022-08-05 | Stop reason: HOSPADM

## 2022-08-05 RX ORDER — NALOXONE HYDROCHLORIDE 0.4 MG/ML
0.2 INJECTION, SOLUTION INTRAMUSCULAR; INTRAVENOUS; SUBCUTANEOUS
Status: DISCONTINUED | OUTPATIENT
Start: 2022-08-05 | End: 2022-08-05 | Stop reason: HOSPADM

## 2022-08-05 RX ORDER — SODIUM CHLORIDE, SODIUM LACTATE, POTASSIUM CHLORIDE, CALCIUM CHLORIDE 600; 310; 30; 20 MG/100ML; MG/100ML; MG/100ML; MG/100ML
125 INJECTION, SOLUTION INTRAVENOUS CONTINUOUS
Status: DISCONTINUED | OUTPATIENT
Start: 2022-08-05 | End: 2022-08-05 | Stop reason: HOSPADM

## 2022-08-05 RX ORDER — DIPHENHYDRAMINE HYDROCHLORIDE 50 MG/ML
12.5 INJECTION, SOLUTION INTRAMUSCULAR; INTRAVENOUS AS NEEDED
Status: DISCONTINUED | OUTPATIENT
Start: 2022-08-05 | End: 2022-08-05 | Stop reason: HOSPADM

## 2022-08-05 RX ORDER — SODIUM CHLORIDE, SODIUM LACTATE, POTASSIUM CHLORIDE, CALCIUM CHLORIDE 600; 310; 30; 20 MG/100ML; MG/100ML; MG/100ML; MG/100ML
100 INJECTION, SOLUTION INTRAVENOUS CONTINUOUS
Status: DISCONTINUED | OUTPATIENT
Start: 2022-08-05 | End: 2022-08-05 | Stop reason: HOSPADM

## 2022-08-05 RX ORDER — HYDROMORPHONE HYDROCHLORIDE 1 MG/ML
.25-1 INJECTION, SOLUTION INTRAMUSCULAR; INTRAVENOUS; SUBCUTANEOUS
Status: DISCONTINUED | OUTPATIENT
Start: 2022-08-05 | End: 2022-08-05 | Stop reason: HOSPADM

## 2022-08-05 RX ORDER — ONDANSETRON 2 MG/ML
4 INJECTION INTRAMUSCULAR; INTRAVENOUS AS NEEDED
Status: DISCONTINUED | OUTPATIENT
Start: 2022-08-05 | End: 2022-08-05 | Stop reason: HOSPADM

## 2022-08-05 RX ORDER — FLUMAZENIL 0.1 MG/ML
0.2 INJECTION INTRAVENOUS
Status: DISCONTINUED | OUTPATIENT
Start: 2022-08-05 | End: 2022-08-05 | Stop reason: HOSPADM

## 2022-08-05 RX ORDER — MIDAZOLAM HYDROCHLORIDE 1 MG/ML
INJECTION, SOLUTION INTRAMUSCULAR; INTRAVENOUS AS NEEDED
Status: DISCONTINUED | OUTPATIENT
Start: 2022-08-05 | End: 2022-08-05 | Stop reason: HOSPADM

## 2022-08-05 RX ORDER — SODIUM CHLORIDE 0.9 % (FLUSH) 0.9 %
5-40 SYRINGE (ML) INJECTION EVERY 8 HOURS
Status: DISCONTINUED | OUTPATIENT
Start: 2022-08-05 | End: 2022-08-05 | Stop reason: HOSPADM

## 2022-08-05 RX ORDER — BUPIVACAINE HYDROCHLORIDE AND EPINEPHRINE 5; 5 MG/ML; UG/ML
INJECTION, SOLUTION EPIDURAL; INTRACAUDAL; PERINEURAL AS NEEDED
Status: DISCONTINUED | OUTPATIENT
Start: 2022-08-05 | End: 2022-08-05 | Stop reason: HOSPADM

## 2022-08-05 RX ORDER — SODIUM CHLORIDE 0.9 % (FLUSH) 0.9 %
5-40 SYRINGE (ML) INJECTION AS NEEDED
Status: DISCONTINUED | OUTPATIENT
Start: 2022-08-05 | End: 2022-08-05 | Stop reason: HOSPADM

## 2022-08-05 RX ORDER — ONDANSETRON 2 MG/ML
INJECTION INTRAMUSCULAR; INTRAVENOUS AS NEEDED
Status: DISCONTINUED | OUTPATIENT
Start: 2022-08-05 | End: 2022-08-05 | Stop reason: HOSPADM

## 2022-08-05 RX ORDER — LIDOCAINE HYDROCHLORIDE 20 MG/ML
INJECTION, SOLUTION EPIDURAL; INFILTRATION; INTRACAUDAL; PERINEURAL AS NEEDED
Status: DISCONTINUED | OUTPATIENT
Start: 2022-08-05 | End: 2022-08-05 | Stop reason: HOSPADM

## 2022-08-05 RX ORDER — HYDROMORPHONE HYDROCHLORIDE 2 MG/ML
INJECTION, SOLUTION INTRAMUSCULAR; INTRAVENOUS; SUBCUTANEOUS AS NEEDED
Status: DISCONTINUED | OUTPATIENT
Start: 2022-08-05 | End: 2022-08-05 | Stop reason: HOSPADM

## 2022-08-05 RX ORDER — LIDOCAINE HYDROCHLORIDE 10 MG/ML
0.1 INJECTION, SOLUTION EPIDURAL; INFILTRATION; INTRACAUDAL; PERINEURAL AS NEEDED
Status: DISCONTINUED | OUTPATIENT
Start: 2022-08-05 | End: 2022-08-05 | Stop reason: HOSPADM

## 2022-08-05 RX ORDER — PROPOFOL 10 MG/ML
INJECTION, EMULSION INTRAVENOUS AS NEEDED
Status: DISCONTINUED | OUTPATIENT
Start: 2022-08-05 | End: 2022-08-05 | Stop reason: HOSPADM

## 2022-08-05 RX ADMIN — HYDROMORPHONE HYDROCHLORIDE 0.5 MG: 2 INJECTION, SOLUTION INTRAMUSCULAR; INTRAVENOUS; SUBCUTANEOUS at 12:15

## 2022-08-05 RX ADMIN — ONDANSETRON HYDROCHLORIDE 4 MG: 2 SOLUTION INTRAMUSCULAR; INTRAVENOUS at 12:30

## 2022-08-05 RX ADMIN — MIDAZOLAM HYDROCHLORIDE 2 MG: 2 INJECTION, SOLUTION INTRAMUSCULAR; INTRAVENOUS at 12:15

## 2022-08-05 RX ADMIN — PROPOFOL 200 MG: 10 INJECTION, EMULSION INTRAVENOUS at 12:23

## 2022-08-05 RX ADMIN — SODIUM CHLORIDE, POTASSIUM CHLORIDE, SODIUM LACTATE AND CALCIUM CHLORIDE 125 ML/HR: 600; 310; 30; 20 INJECTION, SOLUTION INTRAVENOUS at 07:53

## 2022-08-05 RX ADMIN — Medication 10 MG: at 12:38

## 2022-08-05 RX ADMIN — HYDROMORPHONE HYDROCHLORIDE 0.5 MG: 2 INJECTION, SOLUTION INTRAMUSCULAR; INTRAVENOUS; SUBCUTANEOUS at 12:43

## 2022-08-05 RX ADMIN — CEFAZOLIN SODIUM 2 G: 1 POWDER, FOR SOLUTION INTRAMUSCULAR; INTRAVENOUS at 12:30

## 2022-08-05 RX ADMIN — LIDOCAINE HYDROCHLORIDE 60 MG: 20 INJECTION, SOLUTION INTRAVENOUS at 12:23

## 2022-08-05 NOTE — ANESTHESIA PREPROCEDURE EVALUATION
Relevant Problems   CARDIOVASCULAR   (+) Essential hypertension      ENDOCRINE   (+) Hyperthyroidism       Anesthetic History   No history of anesthetic complications            Review of Systems / Medical History  Patient summary reviewed and pertinent labs reviewed    Pulmonary  Within defined limits                 Neuro/Psych         Psychiatric history  Pertinent negatives: No seizures, TIA and CVA   Cardiovascular    Hypertension            Pertinent negatives: No past MI, angina and CHF  Exercise tolerance: >4 METS     GI/Hepatic/Renal     GERD        Pertinent negatives: No renal disease   Endo/Other      Hypothyroidism  Obesity and arthritis  Pertinent negatives: No diabetes   Other Findings   Comments: Hx of ACDF           Physical Exam    Airway  Mallampati: III  TM Distance: 4 - 6 cm  Neck ROM: normal range of motion   Mouth opening: Normal     Cardiovascular      Rate: normal         Dental  No notable dental hx       Pulmonary  Breath sounds clear to auscultation               Abdominal  GI exam deferred       Other Findings            Anesthetic Plan    ASA: 2  Anesthesia type: general          Induction: Intravenous  Anesthetic plan and risks discussed with: Patient      GA, LMA

## 2022-08-05 NOTE — ANESTHESIA POSTPROCEDURE EVALUATION
Procedure(s):  LEFT KNEE ARTHROSCOPIC PARTIAL MEDIAL MENISCECTOMY. general    Anesthesia Post Evaluation        Patient location during evaluation: PACU  Patient participation: complete - patient participated  Level of consciousness: sleepy but conscious  Pain management: adequate  Airway patency: patent  Anesthetic complications: no  Cardiovascular status: acceptable and stable  Respiratory status: acceptable and unassisted  Hydration status: acceptable  Comments: The patient was seen and evaluated in the post-operative period. The time of my evaluation may not match the time of this note. The patient denied uncontrolled pain or nausea, and there were no significant complications evident. Adrian Vazquez MD      Post anesthesia nausea and vomiting:  none  Final Post Anesthesia Temperature Assessment:  Normothermia (36.0-37.5 degrees C)      INITIAL Post-op Vital signs:   Vitals Value Taken Time   /80 08/05/22 1350   Temp     Pulse 85 08/05/22 1356   Resp 12 08/05/22 1356   SpO2 96 % 08/05/22 1356   Vitals shown include unvalidated device data.

## 2022-08-05 NOTE — DISCHARGE INSTRUCTIONS
INSTRUCTIONS AFTER Knee Surgery    Activity:  Wear brace at all times  You may walk with crutches and are allowed to put full weight on your leg when walking      Follow up appointment:  8/10/22 4pm at [address] Therapy:    Your therapy will begin 8/11/22 at 6pm  The most important exercise is to straighten your knee. This is more important than bending it. Diet: You may resume your regular home diet. POST-OP MEDS have been sent to your Pharmacy [facility] [address]  []Ibuprofen []Hydrocodone [x]Oxycodone []Tramadol []Neurontin [x]Zofran []Other: _________________    Take Over the Counter Medications: [x] Tylenol 1000mg 4x/day. [x] Aspirin 81mg twice daily for 30 days    Stool Softeners: If it important to have regular bowel movements. Pain medications can cause constipation. Stool softeners, warm prune juice, increasing your water intake, and increasing fiber can help in preventing constipation. BLOOD CLOT PREVENTION: Pumping your ankles (bringing your toes toward your chest and pushing down like you would on a gas pedal) is another important exercise to do. You need to do these hourly while awake for 7 days to keep blood circulating and prevent a blood clot. [phone] / fax [phone]    2      ICE: Apply ICE to the knee several times daily to help reduce swelling. BANDAGES: Leave dressing on until appointment in office    COMMON SIDE EFFECTS AFTER SURGERY:  Drowsiness, lightheadedness, and nausea may result from the pain medication. PAIN - You need to be as comfortable as possible, but also understand that no amount of pain medication, ice, or rest will completely remove the pain after surgery. Constipation can be caused by pain medication. Increase your fluid intake, drink fruit juices, and take an over the counter stool softener if necessary, for constipation. If it persists, contact your primary care doctor.   Swelling and stiffness about the shoulder are expected for the first several weeks following your surgery. Continuing to ice, use anti-inflammatories, and doing the exercises provided will help this. Mild itching can occur because of pain medications, you may try some Benadryl  SIDE EFFECTS THAT SHOULD NOT BE IGNORED:  Difficulty breathing, a severe rash, extreme drowsiness, and nausea that is uncontrolled with medications. If your side effect is life threatening, call 911, if it is not life threatening, contact our office or go to the emergency room for further instruction. YOU SHOULD CALL THE OFFICE AT [phone], if you experience any of the following: You notice that your bandages are completely saturated with blood. Some blood on the bandages is okay, however. You have a temperature above 101 degrees. Many patients will have an elevated temperature for the first few days after surgery. However, a temperature above 101 degrees is not normal, especially if associated with chills or severe pain. You have pain that is not controlled with pain medication and ice. The pain medication and ice should help control your pain, however, it WILL NOT take away all the pain. Your feet are cold, numb, or purple. They should become warmer as you move around more. DO NOT operate heavy machinery, drive an automobile, or make important decisions while under the influence of anesthesia for at least 24 to 48 hours after surgery or while taking pain medications. You may resume driving when you feel you can do so safely.       COMMENTS:

## 2022-08-05 NOTE — BRIEF OP NOTE
Brief Postoperative Note    Patient: Mathieu Salguero  YOB: 1970  MRN: [id number]    Date of Procedure: 8/5/2022     Pre-Op Diagnosis: Tear of medial meniscus of left knee, current, subsequent encounter [N57.807E]    Post-Op Diagnosis: Same as preoperative diagnosis.       Procedure(s):  LEFT KNEE ARTHROSCOPIC PARTIAL MEDIAL MENISCECTOMY    Surgeon(s):  Patel Rod MD    Surgical Assistant: Surg Asst-1: Erasto Moreno    Anesthesia: General     Estimated Blood Loss (mL): Minimal    Complications: None    Specimens: * No specimens in log *     Implants: * No implants in log *    Drains: * No LDAs found *    Findings: Tear of medial meniscus     Electronically Signed by Bev Ramon MD on 8/5/2022 at 12:51 PM

## 2022-08-16 NOTE — OP NOTES
[facility]  OPERATIVE REPORT    Name:  Kourtney Abdullahi  MR#:  [id number]  :  1970  ACCOUNT #:  [de-identified]  DATE OF SERVICE:  2022    PREOPERATIVE DIAGNOSIS:  Left knee medial meniscus tear. POSTOPERATIVE DIAGNOSES:  Left knee medial meniscus tear. Multiple cartilaginous loose bodies in the left knee. PROCEDURES PERFORMED:  1. Left knee arthroscopic partial medial meniscectomy. 2.  Left knee arthroscopic removal of multiple cartilaginous loose bodies. SURGEON:  MD Farhana Casillas    ANESTHESIA:  General.    COMPLICATIONS:  None. SPECIMENS REMOVED:  None. IMPLANTS:  None. ESTIMATED BLOOD LOSS:  Minimal.    DRAINS:  None. PROCEDURE:  The patient was brought to the operating room and given general anesthesia in a supine position on the OR table. A tourniquet and soft roll were applied to the left thigh. The extremity was exsanguinated with an Esmarch and the tourniquet was inflated. The leg was placed into a leg mendez. The contralateral extremity was appropriately positioned, protected, and secured. The left leg was prepped and draped in a sterile fashion with ChloraPrep. A time out was performed. Preoperative prophylactic antibiotic infusion was confirmed. Standard anteromedial and anterolateral portals were created and the knee was examined systematically. Patellofemoral compartment, suprapatellar pouch, medial and lateral gutters were normal.  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 Lateral compartment with intact meniscus and articular cartilage. There were multiple cartilaginous loose bodies floating throughout all compartments of the knee. A partial medial meniscectomy was performed with a basket punch and shaver removing approximately 20% of the posterior horn. The remaining meniscus was stable to probing. Care was taken to remove the multiple cartilaginous loose bodies in all compartments of the knee anteriorly, medially, laterally, posteriorly, in the gutters and suprapatellar pouch. The joint was irrigated for a final time. There were no remaining loose bodies. The portals were closed with suture. Knee injected with 30 mL of 0.5% Marcaine. Sterile dressing applied. The patient was awakened and returned to the recovery room in stable condition.       Antonella Mcconnell MD      [id number]  D:  08/16/2022 10:41  T:  08/16/2022 11:47  JOB #:  [id number]

## (undated) DEVICE — TUBING, SUCTION, 1/4" X 10', STRAIGHT: Brand: MEDLINE

## (undated) DEVICE — SPONGE GZ W4XL4IN COT 12 PLY TYP VII WVN C FLD DSGN

## (undated) DEVICE — 10K/24K ARTHROSCOPY INFLOW TUBE SET: Brand: 10K/24K

## (undated) DEVICE — ZIMMER® STERILE DISPOSABLE TOURNIQUET CUFF WITH PROTECTIVE SLEEVE AND PLC, DUAL PORT, SINGLE BLADDER, 34 IN. (86 CM)

## (undated) DEVICE — PAD,ABDOMINAL,5"X9",ST,LF,25/BX: Brand: MEDLINE INDUSTRIES, INC.

## (undated) DEVICE — SOLUTION IRRIG 3000ML LAC RINGERS ARTHROMTC PLAS CONT

## (undated) DEVICE — ARTHROSCOPY-SFMC: Brand: MEDLINE INDUSTRIES, INC.

## (undated) DEVICE — DRESSING,GAUZE,XEROFORM,CURAD,1"X8",ST: Brand: CURAD

## (undated) DEVICE — GLOVE ORTHO 8   MSG9480

## (undated) DEVICE — 4-PORT MANIFOLD: Brand: NEPTUNE 2

## (undated) DEVICE — 4.5 MM INCISOR PLUS STRAIGHT                                    BLADES, POWER/EP-1, VIOLET, PACKAGED                                    6 PER BOX, STERILE

## (undated) DEVICE — DRAPE,EXTREMITY,89X128,STERILE: Brand: MEDLINE